# Patient Record
Sex: FEMALE | Race: WHITE | Employment: OTHER | URBAN - METROPOLITAN AREA
[De-identification: names, ages, dates, MRNs, and addresses within clinical notes are randomized per-mention and may not be internally consistent; named-entity substitution may affect disease eponyms.]

---

## 2017-01-13 ENCOUNTER — OFFICE VISIT (OUTPATIENT)
Dept: CARDIOLOGY CLINIC | Age: 67
End: 2017-01-13

## 2017-01-13 VITALS
WEIGHT: 186 LBS | SYSTOLIC BLOOD PRESSURE: 138 MMHG | HEIGHT: 65 IN | DIASTOLIC BLOOD PRESSURE: 76 MMHG | HEART RATE: 76 BPM | RESPIRATION RATE: 18 BRPM | BODY MASS INDEX: 30.99 KG/M2

## 2017-01-13 DIAGNOSIS — I25.10 CORONARY ARTERY DISEASE INVOLVING NATIVE CORONARY ARTERY OF NATIVE HEART WITHOUT ANGINA PECTORIS: Primary | ICD-10-CM

## 2017-01-13 DIAGNOSIS — I10 ESSENTIAL HYPERTENSION: ICD-10-CM

## 2017-01-13 DIAGNOSIS — E78.5 HYPERLIPIDEMIA, UNSPECIFIED: ICD-10-CM

## 2017-01-13 DIAGNOSIS — I73.9 PERIPHERAL VASCULAR DISEASE (HCC): ICD-10-CM

## 2017-01-13 PROCEDURE — 99214 OFFICE O/P EST MOD 30 MIN: CPT | Performed by: INTERNAL MEDICINE

## 2017-01-13 RX ORDER — HYDROCHLOROTHIAZIDE 50 MG/1
50 TABLET ORAL DAILY
Qty: 90 TABLET | Refills: 3 | Status: ON HOLD | OUTPATIENT
Start: 2017-01-13 | End: 2018-11-03 | Stop reason: CLARIF

## 2017-01-16 ENCOUNTER — OFFICE VISIT (OUTPATIENT)
Dept: CARDIOTHORACIC SURGERY | Age: 67
End: 2017-01-16

## 2017-01-16 VITALS
BODY MASS INDEX: 31.72 KG/M2 | HEIGHT: 65 IN | SYSTOLIC BLOOD PRESSURE: 138 MMHG | DIASTOLIC BLOOD PRESSURE: 62 MMHG | WEIGHT: 190.4 LBS

## 2017-01-16 DIAGNOSIS — T82.7XXD INFECTION OF VASCULAR BYPASS GRAFT, SUBSEQUENT ENCOUNTER: Primary | ICD-10-CM

## 2017-01-16 DIAGNOSIS — E78.5 HYPERLIPIDEMIA, UNSPECIFIED: ICD-10-CM

## 2017-01-16 PROCEDURE — 99212 OFFICE O/P EST SF 10 MIN: CPT | Performed by: SURGERY

## 2017-01-16 RX ORDER — MAGNESIUM GLUCONATE 27 MG(500)
500 TABLET ORAL 2 TIMES DAILY
COMMUNITY
End: 2017-11-15

## 2017-01-17 LAB
A/G RATIO: 1.3 (ref 1.1–2.2)
ALBUMIN SERPL-MCNC: 3.6 G/DL (ref 3.4–5)
ALP BLD-CCNC: 129 U/L (ref 40–129)
ALT SERPL-CCNC: 13 U/L (ref 10–40)
ANION GAP SERPL CALCULATED.3IONS-SCNC: 16 MMOL/L (ref 3–16)
AST SERPL-CCNC: 16 U/L (ref 15–37)
BILIRUB SERPL-MCNC: 0.4 MG/DL (ref 0–1)
BUN BLDV-MCNC: 39 MG/DL (ref 7–20)
CALCIUM SERPL-MCNC: 9.3 MG/DL (ref 8.3–10.6)
CHLORIDE BLD-SCNC: 103 MMOL/L (ref 99–110)
CHOLESTEROL, TOTAL: 203 MG/DL (ref 0–199)
CO2: 23 MMOL/L (ref 21–32)
CREAT SERPL-MCNC: 1.5 MG/DL (ref 0.6–1.2)
GFR AFRICAN AMERICAN: 42
GFR NON-AFRICAN AMERICAN: 34
GLOBULIN: 2.7 G/DL
GLUCOSE BLD-MCNC: 260 MG/DL (ref 70–99)
HDLC SERPL-MCNC: 50 MG/DL (ref 40–60)
LDL CHOLESTEROL CALCULATED: 122 MG/DL
POTASSIUM SERPL-SCNC: 5.1 MMOL/L (ref 3.5–5.1)
SODIUM BLD-SCNC: 142 MMOL/L (ref 136–145)
TOTAL PROTEIN: 6.3 G/DL (ref 6.4–8.2)
TRIGL SERPL-MCNC: 154 MG/DL (ref 0–150)
VLDLC SERPL CALC-MCNC: 31 MG/DL

## 2017-04-19 RX ORDER — CLOPIDOGREL BISULFATE 75 MG/1
75 TABLET ORAL NIGHTLY
Qty: 30 TABLET | Refills: 3 | Status: SHIPPED | OUTPATIENT
Start: 2017-04-19

## 2017-05-18 ENCOUNTER — PROCEDURE VISIT (OUTPATIENT)
Dept: VASCULAR SURGERY | Age: 67
End: 2017-05-18

## 2017-05-18 ENCOUNTER — OFFICE VISIT (OUTPATIENT)
Dept: VASCULAR SURGERY | Age: 67
End: 2017-05-18

## 2017-05-18 VITALS
WEIGHT: 197 LBS | HEIGHT: 65 IN | SYSTOLIC BLOOD PRESSURE: 132 MMHG | DIASTOLIC BLOOD PRESSURE: 60 MMHG | BODY MASS INDEX: 32.82 KG/M2

## 2017-05-18 DIAGNOSIS — I70.412 ATHEROSCLEROSIS OF AUTOLOGOUS VEIN BYPASS GRAFT OF LEFT LOWER EXTREMITY WITH INTERMITTENT CLAUDICATION (HCC): ICD-10-CM

## 2017-05-18 DIAGNOSIS — I73.9 PAD (PERIPHERAL ARTERY DISEASE) (HCC): Primary | ICD-10-CM

## 2017-05-18 DIAGNOSIS — Z48.812 ENCOUNTER FOR POSTOPERATIVE SURVEILLANCE OF VASCULAR BYPASS SURGERY: Primary | ICD-10-CM

## 2017-05-18 PROCEDURE — 93926 LOWER EXTREMITY STUDY: CPT | Performed by: SURGERY

## 2017-05-18 PROCEDURE — 99214 OFFICE O/P EST MOD 30 MIN: CPT | Performed by: SURGERY

## 2017-05-25 ENCOUNTER — HOSPITAL ENCOUNTER (OUTPATIENT)
Dept: CT IMAGING | Age: 67
Discharge: HOME | End: 2017-05-25
Attending: SURGERY | Admitting: SURGERY

## 2017-05-25 DIAGNOSIS — I73.9 PAD (PERIPHERAL ARTERY DISEASE) (HCC): ICD-10-CM

## 2017-05-25 DIAGNOSIS — I73.9 PERIPHERAL VASCULAR DISEASE (HCC): Primary | ICD-10-CM

## 2017-05-25 DIAGNOSIS — I70.412 ATHEROSCLEROSIS OF AUTOLOGOUS VEIN BYPASS GRAFT OF LEFT LOWER EXTREMITY WITH INTERMITTENT CLAUDICATION (HCC): ICD-10-CM

## 2017-05-25 LAB
ANION GAP SERPL CALCULATED.3IONS-SCNC: 13 MMOL/L (ref 3–16)
BUN BLDV-MCNC: 47 MG/DL (ref 7–20)
CALCIUM SERPL-MCNC: 9.4 MG/DL (ref 8.3–10.6)
CHLORIDE BLD-SCNC: 103 MMOL/L (ref 99–110)
CO2: 22 MMOL/L (ref 21–32)
CREAT SERPL-MCNC: 1.5 MG/DL (ref 0.6–1.2)
GFR AFRICAN AMERICAN: 42
GFR NON-AFRICAN AMERICAN: 34
GLUCOSE BLD-MCNC: 222 MG/DL (ref 70–99)
POTASSIUM SERPL-SCNC: 5.3 MMOL/L (ref 3.5–5.1)
SODIUM BLD-SCNC: 138 MMOL/L (ref 136–145)

## 2017-06-29 ENCOUNTER — OFFICE VISIT (OUTPATIENT)
Dept: VASCULAR SURGERY | Age: 67
End: 2017-06-29

## 2017-06-29 VITALS
WEIGHT: 197 LBS | BODY MASS INDEX: 32.82 KG/M2 | HEIGHT: 65 IN | SYSTOLIC BLOOD PRESSURE: 128 MMHG | DIASTOLIC BLOOD PRESSURE: 60 MMHG

## 2017-06-29 DIAGNOSIS — I70.412 ATHEROSCLEROSIS OF AUTOLOGOUS VEIN BYPASS GRAFT OF LEFT LOWER EXTREMITY WITH INTERMITTENT CLAUDICATION (HCC): Primary | ICD-10-CM

## 2017-06-29 PROCEDURE — 99213 OFFICE O/P EST LOW 20 MIN: CPT | Performed by: SURGERY

## 2017-07-19 ENCOUNTER — OFFICE VISIT (OUTPATIENT)
Dept: CARDIOLOGY CLINIC | Age: 67
End: 2017-07-19

## 2017-07-19 VITALS — SYSTOLIC BLOOD PRESSURE: 158 MMHG | HEART RATE: 64 BPM | DIASTOLIC BLOOD PRESSURE: 60 MMHG

## 2017-07-19 DIAGNOSIS — J43.9 PULMONARY EMPHYSEMA, UNSPECIFIED EMPHYSEMA TYPE (HCC): ICD-10-CM

## 2017-07-19 DIAGNOSIS — I25.10 CORONARY ARTERY DISEASE INVOLVING NATIVE CORONARY ARTERY OF NATIVE HEART WITHOUT ANGINA PECTORIS: Primary | ICD-10-CM

## 2017-07-19 DIAGNOSIS — I73.9 PERIPHERAL VASCULAR DISEASE (HCC): ICD-10-CM

## 2017-07-19 DIAGNOSIS — I10 ESSENTIAL HYPERTENSION: ICD-10-CM

## 2017-07-19 DIAGNOSIS — E78.5 HYPERLIPIDEMIA, UNSPECIFIED: ICD-10-CM

## 2017-07-19 PROCEDURE — 99214 OFFICE O/P EST MOD 30 MIN: CPT | Performed by: INTERNAL MEDICINE

## 2017-07-19 RX ORDER — CARVEDILOL 25 MG/1
25 TABLET ORAL 2 TIMES DAILY WITH MEALS
Qty: 60 TABLET | Refills: 5 | Status: SHIPPED | OUTPATIENT
Start: 2017-07-19 | End: 2018-01-26 | Stop reason: SDUPTHER

## 2017-08-11 ENCOUNTER — OFFICE VISIT (OUTPATIENT)
Dept: CARDIOLOGY CLINIC | Age: 67
End: 2017-08-11

## 2017-08-11 VITALS — DIASTOLIC BLOOD PRESSURE: 50 MMHG | BODY MASS INDEX: 33.48 KG/M2 | SYSTOLIC BLOOD PRESSURE: 110 MMHG | WEIGHT: 201.2 LBS

## 2017-08-11 DIAGNOSIS — Z98.61 S/P PTCA (PERCUTANEOUS TRANSLUMINAL CORONARY ANGIOPLASTY): ICD-10-CM

## 2017-08-11 DIAGNOSIS — I70.202 FEMORAL ARTERY STENOSIS, LEFT (HCC): Primary | ICD-10-CM

## 2017-08-11 DIAGNOSIS — I73.9 PAD (PERIPHERAL ARTERY DISEASE) (HCC): ICD-10-CM

## 2017-08-11 DIAGNOSIS — E78.00 PURE HYPERCHOLESTEROLEMIA: ICD-10-CM

## 2017-08-11 DIAGNOSIS — E78.5 HYPERLIPIDEMIA, UNSPECIFIED: ICD-10-CM

## 2017-08-11 DIAGNOSIS — I10 ESSENTIAL HYPERTENSION: ICD-10-CM

## 2017-08-11 DIAGNOSIS — E11.51 TYPE 2 DIABETES MELLITUS WITH DIABETIC PERIPHERAL ANGIOPATHY WITHOUT GANGRENE, WITH LONG-TERM CURRENT USE OF INSULIN (HCC): ICD-10-CM

## 2017-08-11 DIAGNOSIS — Z79.4 TYPE 2 DIABETES MELLITUS WITH DIABETIC PERIPHERAL ANGIOPATHY WITHOUT GANGRENE, WITH LONG-TERM CURRENT USE OF INSULIN (HCC): ICD-10-CM

## 2017-08-11 DIAGNOSIS — M54.2 CERVICAL PAIN: ICD-10-CM

## 2017-08-11 PROCEDURE — 99214 OFFICE O/P EST MOD 30 MIN: CPT | Performed by: NURSE PRACTITIONER

## 2017-09-06 LAB
AVERAGE GLUCOSE: NORMAL
HBA1C MFR BLD: 9 %

## 2017-09-27 ENCOUNTER — HOSPITAL ENCOUNTER (OUTPATIENT)
Dept: ULTRASOUND IMAGING | Age: 67
Discharge: HOME | End: 2017-09-27
Attending: INTERNAL MEDICINE | Admitting: INTERNAL MEDICINE

## 2017-09-27 DIAGNOSIS — N18.3 CKD (CHRONIC KIDNEY DISEASE), STAGE 3 (MODERATE): ICD-10-CM

## 2017-09-27 DIAGNOSIS — N18.30 CHRONIC KIDNEY DISEASE, STAGE III (MODERATE) (HCC): ICD-10-CM

## 2017-10-12 ENCOUNTER — PROCEDURE VISIT (OUTPATIENT)
Dept: VASCULAR SURGERY | Age: 67
End: 2017-10-12

## 2017-10-12 ENCOUNTER — OFFICE VISIT (OUTPATIENT)
Dept: VASCULAR SURGERY | Age: 67
End: 2017-10-12

## 2017-10-12 VITALS
BODY MASS INDEX: 32.82 KG/M2 | WEIGHT: 197 LBS | DIASTOLIC BLOOD PRESSURE: 60 MMHG | SYSTOLIC BLOOD PRESSURE: 120 MMHG | HEIGHT: 65 IN

## 2017-10-12 DIAGNOSIS — Z48.812 AFTERCARE FOLLOWING SURGERY OF THE CIRCULATORY SYSTEM: ICD-10-CM

## 2017-10-12 DIAGNOSIS — Z48.812 ENCOUNTER FOR POSTOPERATIVE SURVEILLANCE OF VASCULAR BYPASS SURGERY: Primary | ICD-10-CM

## 2017-10-12 DIAGNOSIS — I70.412 ATHEROSCLEROSIS OF AUTOLOGOUS VEIN BYPASS GRAFT OF LEFT LOWER EXTREMITY WITH INTERMITTENT CLAUDICATION (HCC): Primary | ICD-10-CM

## 2017-10-12 PROCEDURE — 93926 LOWER EXTREMITY STUDY: CPT | Performed by: SURGERY

## 2017-10-12 PROCEDURE — 99213 OFFICE O/P EST LOW 20 MIN: CPT | Performed by: SURGERY

## 2017-10-12 NOTE — PROGRESS NOTES
off on any attempt at treatment of inflow unless affecting DGV therefore early 3 month scan is necessary.

## 2017-11-15 ENCOUNTER — OFFICE VISIT (OUTPATIENT)
Dept: CARDIOLOGY CLINIC | Age: 67
End: 2017-11-15

## 2017-11-15 VITALS
HEART RATE: 70 BPM | SYSTOLIC BLOOD PRESSURE: 140 MMHG | WEIGHT: 204 LBS | DIASTOLIC BLOOD PRESSURE: 60 MMHG | BODY MASS INDEX: 33.95 KG/M2

## 2017-11-15 DIAGNOSIS — R07.9 CHEST PAIN, UNSPECIFIED TYPE: ICD-10-CM

## 2017-11-15 DIAGNOSIS — E78.2 MIXED HYPERLIPIDEMIA: ICD-10-CM

## 2017-11-15 DIAGNOSIS — Z79.4 TYPE 2 DIABETES MELLITUS WITH DIABETIC PERIPHERAL ANGIOPATHY WITHOUT GANGRENE, WITH LONG-TERM CURRENT USE OF INSULIN (HCC): ICD-10-CM

## 2017-11-15 DIAGNOSIS — I10 ESSENTIAL HYPERTENSION: ICD-10-CM

## 2017-11-15 DIAGNOSIS — E11.51 TYPE 2 DIABETES MELLITUS WITH DIABETIC PERIPHERAL ANGIOPATHY WITHOUT GANGRENE, WITH LONG-TERM CURRENT USE OF INSULIN (HCC): ICD-10-CM

## 2017-11-15 DIAGNOSIS — I25.10 CORONARY ARTERY DISEASE INVOLVING NATIVE CORONARY ARTERY OF NATIVE HEART WITHOUT ANGINA PECTORIS: Primary | ICD-10-CM

## 2017-11-15 PROCEDURE — 99214 OFFICE O/P EST MOD 30 MIN: CPT | Performed by: INTERNAL MEDICINE

## 2017-11-15 NOTE — PROGRESS NOTES
CC/HPI:    69 y.o. here for f/u. Has h/o CAD and PCI. Now seeing Dr. Jocelyn Buitrago for nephrology, and he has been helping with BP. Occasional chest pain. Gets it in mid chest, feels like \"impending doom. \" It's a pain hard to describe. Gone after taking anxiety meds (xanax). No sig nausea or vomiting. Pain in chest doesn't radiate for most part. No syncope. L leg edema. No orthopnea or PND.     Past Medical History:   Diagnosis Date    Adenomatous colon polyp 9/2015    per colonoscopy - sigmoid, repeat 1 yr    Adrenal nodule ([address] 4/25/2010    Allergic rhinitis     Anxiety     Back pain     Bacterial infection due to Staphylococcus aureus 4/25/2010    Blood transfusion     CAD (coronary artery disease) 4/25/2010    Cancer of sigmoid colon ([address]     Depressed 5/3/2010    Diabetes mellitus type II 4/25/2010    Dry skin     hands    Fibromyalgia 4/25/2010    GERD (gastroesophageal reflux disease)     History of blood transfusion     HTN (hypertension) 4/25/2010    Hyperlipemia 4/25/2010    Infection of prosthetic vascular graft (HCC) 07/2016    Insomnia     Lumbar disc disease 4/25/2010    Lumbar spinal stenosis 4/25/2010    Numbness     left arm and leg \"probably due to arteries/surgeries\"    Osteopenia 4/25/2010    Peripheral vascular disease ([address] 4/25/2010    PONV (postoperative nausea and vomiting)     Renal artery stenosis ([address] 5/17/2010    S/P vascular surgery      Past Surgical History:   Procedure Laterality Date    ABDOMINAL AORTIC [place] Nick Medina, per pt    ANKLE FRACTURE SURGERY  10/03    has hardware    AORTO-FEMORAL BYPASS GRAFT  11/92    ARTERIAL BYPASS SURGRY Left 11/2011    Dr. Tomasa Peñaloza - femoral-PT bypass   Steve Verduzco  5/3/2012    Dr. Tomasa Peñaloza - L EIA    BYPASS GRAFT Left 6/9/2011    Dr. Tomasa Peñaloza - redo L profunda fem - PT composite w/basilic & cephalic vein grafts    CARDIAC SURGERY      CHOLECYSTECTOMY, LAPAROSCOPIC  1/21/2014 07/10/2017    HGB 10.9 (L) 07/10/2017    HCT 31.7 (L) 09/08/2016    MCV 92.5 09/08/2016     07/10/2017       Chemistry        Component Value Date/Time     09/25/2017 0914    K 4.7 09/25/2017 0914     09/25/2017 0914    CO2 26 09/25/2017 0914    BUN 42 (H) 09/25/2017 0914    CREATININE 1.3 (H) 09/25/2017 0914        Component Value Date/Time    CALCIUM 8.8 09/25/2017 0914    ALKPHOS 129 01/16/2017 1500    AST 16 01/16/2017 1500    ALT 13 01/16/2017 1500    BILITOT 0.4 01/16/2017 1500        Lab Results   Component Value Date    INR 1.10 07/10/2017    INR 1.22 (H) 07/09/2016    INR 1.25 (H) 07/08/2016    PROTIME 14.0 (H) 07/09/2016    PROTIME 14.3 (H) 07/08/2016    PROTIME 14.0 (H) 07/08/2016 1/8/14 ECG: Normal    10/2011 Cath: Angiographic Findings:   Left Main: Ostial 10-20% lesion. Left Anterior Descending: Diffusely diseased and narrowed artery, from proximal to distal. There is a mid 50-60% angiographic stenosis at the bifurcation of a large diagonal branch. There is a patent stent in the proximal LAD. Circumflex: Diffusely diseased. 30% angiographic lesion in the mid vessel after the second marginal branch. The distal LCx is diseased but no flow-limiting stenosis. Right Coronary: Ostial 80% stenosis. Diffusely diseased artery. 30% mid RCA lesion. 30% distal RCA stenosis. Patent mid rca stents. RCA stented with a 2.25 x 12 mm Xience MARNIE    2013 Stress Nuc: Poss lateral ischemia. May 2014 coronary angiogram  Angiographic Findings: Right dominant system  Left Main: 20% stenosis  Left Anterior Descending: Severely and diffusely diseased artery. Mid diffuse 60% angiographic stenosis (probably more severe compared to a \"normal\" caliber artery, which she does not really have). One large diagonal branch with severe diffuse disease mid and distally. Circumflex: Minimal proximal vessel stenosis. OM 1 is very high and bifurcates distally.  The inferior branch is essentially occluded nightly, Disp: 30 tablet, Rfl: 3    hydrochlorothiazide (HYDRODIURIL) 50 MG tablet, Take 1 tablet by mouth daily, Disp: 90 tablet, Rfl: 3    oxyCODONE-acetaminophen (PERCOCET) 5-325 MG per tablet, Take 1 tablet by mouth every 8 hours as needed for Pain  Indications: taking 7.5 ., Disp: , Rfl:     insulin glargine (LANTUS) 100 UNIT/ML injection vial, Inject 10 Units into the skin daily, Disp: 1 vial, Rfl: 3    atorvastatin (LIPITOR) 40 MG tablet, Take 40 mg by mouth nightly, Disp: , Rfl:     acetaminophen (TYLENOL) 500 MG tablet, Take 500 mg by mouth every 6 hours as needed for Pain, Disp: , Rfl:     magnesium oxide (MAG-OX) 400 MG tablet, Take 400 mg by mouth daily, Disp: , Rfl:     pantoprazole (PROTONIX) 40 MG tablet, Take 1 tablet by mouth daily, Disp: 30 tablet, Rfl: 0    ALPRAZolam (XANAX) 0.25 MG tablet, Take 1 tablet by mouth 3 times daily, Disp: 90 tablet, Rfl: 0    lisinopril (PRINIVIL;ZESTRIL) 40 MG tablet, TAKE ONE TABLET BY MOUTH DAILY, Disp: 30 tablet, Rfl: 5    insulin aspart (NOVOLOG FLEXPEN) 100 UNIT/ML injection pen, 0-100  6 units, 101-150 8 units, 151-200 10 units, 201-250 12 units, 251-300 14 units, 301-350 16 units, 351-400 18 units, over 400 20 units, Disp: 15 Pen, Rfl: 3    VITAMIN D-3 SUPER STRENGTH 2000 UNITS TABS, TAKE ONE TABLET BY MOUTH EVERY DAY, Disp: 30 tablet, Rfl: 10    aspirin 81 MG tablet, Take 81 mg by mouth daily. , Disp: , Rfl:     Assessment:  69 y.o. here for f/u. Doing well from cardiac standpoint. Issues:  1. Coronary artery disease involving native coronary artery of native heart without angina pectoris    2. Essential hypertension    3. Type 2 diabetes mellitus with diabetic peripheral angiopathy without gangrene, with long-term current use of insulin ([address]    4. Mixed hyperlipidemia    5. Chest pain, unspecified type        Plan:  Cont present meds for now. F/U 6 mo, sooner if needed. Sarbjit Noriega MD, [facility], [facility]

## 2018-01-19 ENCOUNTER — PROCEDURE VISIT (OUTPATIENT)
Dept: VASCULAR SURGERY | Age: 68
End: 2018-01-19

## 2018-01-19 DIAGNOSIS — Z48.812 ENCOUNTER FOR POSTOPERATIVE SURVEILLANCE OF VASCULAR BYPASS SURGERY: Primary | ICD-10-CM

## 2018-01-19 DIAGNOSIS — I73.9 PERIPHERAL VASCULAR DISEASE (HCC): ICD-10-CM

## 2018-01-19 PROCEDURE — 93926 LOWER EXTREMITY STUDY: CPT | Performed by: SURGERY

## 2018-01-26 RX ORDER — CARVEDILOL 25 MG/1
TABLET ORAL
Qty: 60 TABLET | Refills: 4 | Status: SHIPPED | OUTPATIENT
Start: 2018-01-26 | End: 2018-06-27 | Stop reason: SDUPTHER

## 2018-02-06 ENCOUNTER — TELEPHONE (OUTPATIENT)
Dept: VASCULAR SURGERY | Age: 68
End: 2018-02-06

## 2018-06-13 ENCOUNTER — OFFICE VISIT (OUTPATIENT)
Dept: CARDIOLOGY CLINIC | Age: 68
End: 2018-06-13

## 2018-06-13 VITALS
WEIGHT: 205.2 LBS | SYSTOLIC BLOOD PRESSURE: 140 MMHG | BODY MASS INDEX: 34.15 KG/M2 | HEART RATE: 62 BPM | DIASTOLIC BLOOD PRESSURE: 70 MMHG

## 2018-06-13 DIAGNOSIS — I25.119 CORONARY ARTERY DISEASE INVOLVING NATIVE CORONARY ARTERY OF NATIVE HEART WITH ANGINA PECTORIS (HCC): Primary | ICD-10-CM

## 2018-06-13 DIAGNOSIS — I70.0 ATHEROSCLEROSIS OF AORTA (HCC): ICD-10-CM

## 2018-06-13 DIAGNOSIS — I10 ESSENTIAL HYPERTENSION: ICD-10-CM

## 2018-06-13 DIAGNOSIS — E78.00 PURE HYPERCHOLESTEROLEMIA: ICD-10-CM

## 2018-06-13 DIAGNOSIS — I73.9 PERIPHERAL VASCULAR DISEASE (HCC): ICD-10-CM

## 2018-06-13 PROCEDURE — 99214 OFFICE O/P EST MOD 30 MIN: CPT | Performed by: INTERNAL MEDICINE

## 2018-06-13 RX ORDER — AMLODIPINE BESYLATE 2.5 MG/1
2.5 TABLET ORAL DAILY
Qty: 90 TABLET | Refills: 3 | Status: ON HOLD | OUTPATIENT
Start: 2018-06-13 | End: 2018-11-03 | Stop reason: CLARIF

## 2018-06-13 RX ORDER — ISOSORBIDE MONONITRATE 120 MG/1
120 TABLET, EXTENDED RELEASE ORAL DAILY
Qty: 90 TABLET | Refills: 3 | Status: ON HOLD | OUTPATIENT
Start: 2018-06-13 | End: 2018-11-03 | Stop reason: CLARIF

## 2018-06-28 RX ORDER — CARVEDILOL 25 MG/1
TABLET ORAL
Qty: 60 TABLET | Refills: 5 | Status: ON HOLD | OUTPATIENT
Start: 2018-06-28 | End: 2018-11-03 | Stop reason: CLARIF

## 2018-07-26 ENCOUNTER — OFFICE VISIT (OUTPATIENT)
Dept: VASCULAR SURGERY | Age: 68
End: 2018-07-26

## 2018-07-26 ENCOUNTER — PROCEDURE VISIT (OUTPATIENT)
Dept: VASCULAR SURGERY | Age: 68
End: 2018-07-26

## 2018-07-26 VITALS
DIASTOLIC BLOOD PRESSURE: 60 MMHG | WEIGHT: 193 LBS | BODY MASS INDEX: 32.15 KG/M2 | SYSTOLIC BLOOD PRESSURE: 138 MMHG | HEIGHT: 65 IN

## 2018-07-26 DIAGNOSIS — I70.202 FEMORAL ARTERY STENOSIS, LEFT (HCC): ICD-10-CM

## 2018-07-26 DIAGNOSIS — I70.412 ATHEROSCLEROSIS OF AUTOLOGOUS VEIN BYPASS GRAFT OF LEFT LOWER EXTREMITY WITH INTERMITTENT CLAUDICATION (HCC): ICD-10-CM

## 2018-07-26 DIAGNOSIS — Z48.812 ENCOUNTER FOR POSTOPERATIVE SURVEILLANCE OF VASCULAR BYPASS SURGERY: Primary | ICD-10-CM

## 2018-07-26 DIAGNOSIS — Z48.812 AFTERCARE FOLLOWING SURGERY OF THE CIRCULATORY SYSTEM: Primary | ICD-10-CM

## 2018-07-26 PROCEDURE — 93926 LOWER EXTREMITY STUDY: CPT | Performed by: SURGERY

## 2018-07-26 PROCEDURE — 99213 OFFICE O/P EST LOW 20 MIN: CPT | Performed by: SURGERY

## 2018-09-16 ENCOUNTER — APPOINTMENT (OUTPATIENT)
Dept: GENERAL RADIOLOGY | Age: 68
End: 2018-09-16
Payer: COMMERCIAL

## 2018-09-16 ENCOUNTER — HOSPITAL ENCOUNTER (EMERGENCY)
Age: 68
Discharge: HOME | End: 2018-09-16
Attending: EMERGENCY MEDICINE
Payer: COMMERCIAL

## 2018-09-16 VITALS
HEIGHT: 64 IN | RESPIRATION RATE: 18 BRPM | DIASTOLIC BLOOD PRESSURE: 49 MMHG | TEMPERATURE: 97.5 F | SYSTOLIC BLOOD PRESSURE: 158 MMHG | OXYGEN SATURATION: 93 % | WEIGHT: 190 LBS | BODY MASS INDEX: 32.44 KG/M2 | HEART RATE: 59 BPM

## 2018-09-16 DIAGNOSIS — R06.89 TROUBLE BREATHING: ICD-10-CM

## 2018-09-16 DIAGNOSIS — F41.9 ANXIETY: Primary | ICD-10-CM

## 2018-09-16 DIAGNOSIS — R07.9 CHEST PAIN, UNSPECIFIED TYPE: ICD-10-CM

## 2018-09-16 LAB
BACTERIA: ABNORMAL /HPF
BILIRUBIN URINE: NEGATIVE MG/DL
BLOOD, URINE: NEGATIVE
CALCIUM IONIZED: 1.17 MMOL/L (ref 1.12–1.32)
CLARITY: ABNORMAL
CO2: 23 MMOL/L (ref 21–32)
COLOR: ABNORMAL
EPITHELIAL CELLS, UA: ABNORMAL /HPF
GFR AFRICAN AMERICAN: 34
GFR NON-AFRICAN AMERICAN: 28
GLUCOSE BLD-MCNC: 166 MG/DL (ref 70–99)
GLUCOSE URINE: NEGATIVE MG/DL
KETONES, URINE: NEGATIVE MG/DL
LEUKOCYTE ESTERASE, URINE: ABNORMAL
MICROSCOPIC EXAMINATION: YES
NITRITE, URINE: NEGATIVE
PERFORMED ON: ABNORMAL
PERFORMED ON: NORMAL
PH UA: 6
POC ANION GAP: 12 (ref 10–20)
POC BUN: 37 MG/DL (ref 7–18)
POC CHLORIDE: 101 MMOL/L (ref 99–110)
POC CREATININE: 1.8 MG/DL (ref 0.6–1.2)
POC POTASSIUM: 3.8 MMOL/L (ref 3.5–5.1)
POC SAMPLE TYPE: ABNORMAL
POC SAMPLE TYPE: NORMAL
POC SODIUM: 136 MMOL/L (ref 136–145)
POC TROPONIN I: 0.01 NG/ML (ref 0–0.1)
PROTEIN UA: 100 MG/DL
RBC UA: ABNORMAL /HPF (ref 0–2)
SPECIFIC GRAVITY UA: 1.01
UROBILINOGEN, URINE: 0.2 E.U./DL
WBC UA: ABNORMAL /HPF (ref 0–5)

## 2018-09-16 PROCEDURE — 80047 BASIC METABLC PNL IONIZED CA: CPT

## 2018-09-16 PROCEDURE — 6370000000 HC RX 637 (ALT 250 FOR IP): Performed by: EMERGENCY MEDICINE

## 2018-09-16 PROCEDURE — 87077 CULTURE AEROBIC IDENTIFY: CPT

## 2018-09-16 PROCEDURE — 84484 ASSAY OF TROPONIN QUANT: CPT

## 2018-09-16 PROCEDURE — 71046 X-RAY EXAM CHEST 2 VIEWS: CPT

## 2018-09-16 PROCEDURE — 87186 SC STD MICRODIL/AGAR DIL: CPT

## 2018-09-16 PROCEDURE — 81001 URINALYSIS AUTO W/SCOPE: CPT

## 2018-09-16 PROCEDURE — 93005 ELECTROCARDIOGRAM TRACING: CPT | Performed by: EMERGENCY MEDICINE

## 2018-09-16 PROCEDURE — 87086 URINE CULTURE/COLONY COUNT: CPT

## 2018-09-16 PROCEDURE — 99284 EMERGENCY DEPT VISIT MOD MDM: CPT

## 2018-09-16 RX ORDER — ACETAMINOPHEN 325 MG/1
650 TABLET ORAL ONCE
Status: COMPLETED | OUTPATIENT
Start: 2018-09-16 | End: 2018-09-16

## 2018-09-16 RX ORDER — ALPRAZOLAM 0.5 MG/1
0.5 TABLET ORAL ONCE
Status: COMPLETED | OUTPATIENT
Start: 2018-09-16 | End: 2018-09-16

## 2018-09-16 RX ADMIN — ACETAMINOPHEN 650 MG: 325 TABLET ORAL at 02:34

## 2018-09-16 RX ADMIN — ALPRAZOLAM 0.5 MG: 0.5 TABLET ORAL at 01:26

## 2018-09-16 ASSESSMENT — PAIN SCALES - GENERAL: PAINLEVEL_OUTOF10: 5

## 2018-09-16 NOTE — ED PROVIDER NOTES
ED Attending Attestation Note     Date of evaluation: 9/16/2018    This patient was seen by the resident. I have seen and examined the patient, agree with the workup, evaluation, management and diagnosis. The care plan has been discussed. I have reviewed the ECG and concur with the resident's interpretation. My assessment reveals A well-appearing female in no acute distress. Presentation here she was complaining of shortness of breath very tachypnea but a clear lung sounds bilaterally. States she is very anxious and had not taken her Xanax. Here she was given Xanax a complete resolution of her symptoms. She endorses a mild frontal headache but has had these before. It was not acute in onset. She completed around the emergency department was able to cannulate back to the bathroom and back without issue. Has clear lung sounds bilaterally picking. She is no respiratory distress with a benign abdominal examination.        Patsy Lowry MD  09/16/18 [id number]

## 2018-09-16 NOTE — ED NOTES
Patient arrived by squad, states awoke and felt anxious, forgot to take nerve pill prior to bedtime. Patient states family did not want to wait for her to take medication instead called life squad for evaluation.  Patient states increased anxiety at night when home alone until daughter able to come home after work after [address] am.      Aimee Ballesteros RN  09/16/18 [id number]

## 2018-09-18 LAB
ORGANISM: ABNORMAL
ORGANISM: ABNORMAL
URINE CULTURE, ROUTINE: ABNORMAL

## 2018-09-25 LAB
EKG ATRIAL RATE: 57 BPM
EKG DIAGNOSIS: NORMAL
EKG P AXIS: 61 DEGREES
EKG P-R INTERVAL: 186 MS
EKG Q-T INTERVAL: 422 MS
EKG QRS DURATION: 96 MS
EKG QTC CALCULATION (BAZETT): 410 MS
EKG R AXIS: -37 DEGREES
EKG T AXIS: 46 DEGREES
EKG VENTRICULAR RATE: 57 BPM

## 2018-11-03 ENCOUNTER — HOSPITAL ENCOUNTER (INPATIENT)
Age: 68
LOS: 11 days | Discharge: NURSING, REHAB OR LONG-TERM CARE | DRG: 056 | End: 2018-11-14
Attending: EMERGENCY MEDICINE | Admitting: INTERNAL MEDICINE
Payer: COMMERCIAL

## 2018-11-03 ENCOUNTER — APPOINTMENT (OUTPATIENT)
Dept: CT IMAGING | Age: 68
DRG: 056 | End: 2018-11-03
Payer: COMMERCIAL

## 2018-11-03 ENCOUNTER — APPOINTMENT (OUTPATIENT)
Dept: GENERAL RADIOLOGY | Age: 68
DRG: 056 | End: 2018-11-03
Payer: COMMERCIAL

## 2018-11-03 DIAGNOSIS — J18.9 PNEUMONIA DUE TO ORGANISM: ICD-10-CM

## 2018-11-03 DIAGNOSIS — R11.2 NON-INTRACTABLE VOMITING WITH NAUSEA, UNSPECIFIED VOMITING TYPE: ICD-10-CM

## 2018-11-03 DIAGNOSIS — R51.9 NONINTRACTABLE HEADACHE, UNSPECIFIED CHRONICITY PATTERN, UNSPECIFIED HEADACHE TYPE: ICD-10-CM

## 2018-11-03 DIAGNOSIS — R56.9 SEIZURE (HCC): Primary | ICD-10-CM

## 2018-11-03 DIAGNOSIS — F41.9 ANXIETY: ICD-10-CM

## 2018-11-03 LAB
A/G RATIO: 1.2 (ref 1.1–2.2)
ALBUMIN SERPL-MCNC: 3.7 G/DL (ref 3.4–5)
ALP BLD-CCNC: 89 U/L (ref 40–129)
ALT SERPL-CCNC: 13 U/L (ref 10–40)
ANION GAP SERPL CALCULATED.3IONS-SCNC: 20 MMOL/L (ref 3–16)
AST SERPL-CCNC: 17 U/L (ref 15–37)
BACTERIA: ABNORMAL /HPF
BASE EXCESS VENOUS: -2 (ref -3–3)
BASOPHILS ABSOLUTE: 0.1 K/UL (ref 0–0.2)
BASOPHILS RELATIVE PERCENT: 0.9 %
BILIRUB SERPL-MCNC: 0.6 MG/DL (ref 0–1)
BILIRUBIN URINE: NEGATIVE MG/DL
BLOOD, URINE: ABNORMAL
BUN BLDV-MCNC: 22 MG/DL (ref 7–20)
CALCIUM SERPL-MCNC: 9.9 MG/DL (ref 8.3–10.6)
CHLORIDE BLD-SCNC: 101 MMOL/L (ref 99–110)
CLARITY: ABNORMAL
CO2: 20 MMOL/L (ref 21–32)
COLOR: ABNORMAL
CREAT SERPL-MCNC: 1.3 MG/DL (ref 0.6–1.2)
EOSINOPHILS ABSOLUTE: 0.5 K/UL (ref 0–0.6)
EOSINOPHILS RELATIVE PERCENT: 3.7 %
EPITHELIAL CELLS, UA: ABNORMAL /HPF
GFR AFRICAN AMERICAN: 49
GFR NON-AFRICAN AMERICAN: 40
GLOBULIN: 3 G/DL
GLUCOSE BLD-MCNC: 269 MG/DL (ref 70–99)
GLUCOSE URINE: 250 MG/DL
HCO3 VENOUS: 23 MMOL/L (ref 23–29)
HCT VFR BLD CALC: 37 % (ref 36–48)
HEMOGLOBIN: 12.5 G/DL (ref 12–16)
KETONES, URINE: NEGATIVE MG/DL
LACTATE: 11.96 MMOL/L (ref 0.4–2)
LACTATE: 4 MMOL/L (ref 0.4–2)
LEUKOCYTE ESTERASE, URINE: NEGATIVE
LIPASE: 22 U/L (ref 13–60)
LYMPHOCYTES ABSOLUTE: 1.2 K/UL (ref 1–5.1)
LYMPHOCYTES RELATIVE PERCENT: 9.7 %
MCH RBC QN AUTO: 31.6 PG (ref 26–34)
MCHC RBC AUTO-ENTMCNC: 33.7 G/DL (ref 31–36)
MCV RBC AUTO: 93.7 FL (ref 80–100)
MICROSCOPIC EXAMINATION: YES
MONOCYTES ABSOLUTE: 0.7 K/UL (ref 0–1.3)
MONOCYTES RELATIVE PERCENT: 5.4 %
NEUTROPHILS ABSOLUTE: 9.9 K/UL (ref 1.7–7.7)
NEUTROPHILS RELATIVE PERCENT: 80.3 %
NITRITE, URINE: NEGATIVE
O2 SAT, VEN: 81 %
PCO2, VEN: 40.2 MM HG (ref 40–50)
PDW BLD-RTO: 13.6 % (ref 12.4–15.4)
PERFORMED ON: ABNORMAL
PERFORMED ON: ABNORMAL
PH UA: 7
PH VENOUS: 7.37 (ref 7.35–7.45)
PLATELET # BLD: 202 K/UL (ref 135–450)
PMV BLD AUTO: 9.6 FL (ref 5–10.5)
PO2, VEN: 46 MM HG
POC SAMPLE TYPE: ABNORMAL
POC SAMPLE TYPE: ABNORMAL
POTASSIUM SERPL-SCNC: 4 MMOL/L (ref 3.5–5.1)
PROTEIN UA: >=300 MG/DL
RBC # BLD: 3.95 M/UL (ref 4–5.2)
RBC UA: ABNORMAL /HPF (ref 0–2)
SODIUM BLD-SCNC: 141 MMOL/L (ref 136–145)
SPECIFIC GRAVITY UA: 1.02
TCO2 CALC VENOUS: 24 MMOL/L
TOTAL PROTEIN: 6.7 G/DL (ref 6.4–8.2)
TROPONIN: 0.03 NG/ML
UROBILINOGEN, URINE: 0.2 E.U./DL
WBC # BLD: 12.3 K/UL (ref 4–11)
WBC UA: ABNORMAL /HPF (ref 0–5)

## 2018-11-03 PROCEDURE — 99285 EMERGENCY DEPT VISIT HI MDM: CPT

## 2018-11-03 PROCEDURE — 93005 ELECTROCARDIOGRAM TRACING: CPT | Performed by: EMERGENCY MEDICINE

## 2018-11-03 PROCEDURE — 80053 COMPREHEN METABOLIC PANEL: CPT

## 2018-11-03 PROCEDURE — 81001 URINALYSIS AUTO W/SCOPE: CPT

## 2018-11-03 PROCEDURE — 96367 TX/PROPH/DG ADDL SEQ IV INF: CPT

## 2018-11-03 PROCEDURE — 1200000000 HC SEMI PRIVATE

## 2018-11-03 PROCEDURE — 2580000003 HC RX 258: Performed by: EMERGENCY MEDICINE

## 2018-11-03 PROCEDURE — 6360000002 HC RX W HCPCS: Performed by: EMERGENCY MEDICINE

## 2018-11-03 PROCEDURE — 96365 THER/PROPH/DIAG IV INF INIT: CPT

## 2018-11-03 PROCEDURE — 6360000004 HC RX CONTRAST MEDICATION: Performed by: EMERGENCY MEDICINE

## 2018-11-03 PROCEDURE — 82803 BLOOD GASES ANY COMBINATION: CPT

## 2018-11-03 PROCEDURE — 83605 ASSAY OF LACTIC ACID: CPT

## 2018-11-03 PROCEDURE — 96368 THER/DIAG CONCURRENT INF: CPT

## 2018-11-03 PROCEDURE — 6360000002 HC RX W HCPCS

## 2018-11-03 PROCEDURE — 84484 ASSAY OF TROPONIN QUANT: CPT

## 2018-11-03 PROCEDURE — 83690 ASSAY OF LIPASE: CPT

## 2018-11-03 PROCEDURE — 96366 THER/PROPH/DIAG IV INF ADDON: CPT

## 2018-11-03 PROCEDURE — 70450 CT HEAD/BRAIN W/O DYE: CPT

## 2018-11-03 PROCEDURE — 96375 TX/PRO/DX INJ NEW DRUG ADDON: CPT

## 2018-11-03 PROCEDURE — 36415 COLL VENOUS BLD VENIPUNCTURE: CPT

## 2018-11-03 PROCEDURE — 96361 HYDRATE IV INFUSION ADD-ON: CPT

## 2018-11-03 PROCEDURE — 74177 CT ABD & PELVIS W/CONTRAST: CPT

## 2018-11-03 PROCEDURE — 85025 COMPLETE CBC W/AUTO DIFF WBC: CPT

## 2018-11-03 PROCEDURE — 71045 X-RAY EXAM CHEST 1 VIEW: CPT

## 2018-11-03 RX ORDER — ASPIRIN 325 MG
324 TABLET ORAL DAILY
Status: DISCONTINUED | OUTPATIENT
Start: 2018-11-04 | End: 2018-11-07

## 2018-11-03 RX ORDER — DEXTROSE MONOHYDRATE 50 MG/ML
100 INJECTION, SOLUTION INTRAVENOUS PRN
Status: DISCONTINUED | OUTPATIENT
Start: 2018-11-03 | End: 2018-11-14 | Stop reason: HOSPADM

## 2018-11-03 RX ORDER — DEXTROSE MONOHYDRATE 25 G/50ML
12.5 INJECTION, SOLUTION INTRAVENOUS PRN
Status: DISCONTINUED | OUTPATIENT
Start: 2018-11-03 | End: 2018-11-14 | Stop reason: HOSPADM

## 2018-11-03 RX ORDER — ESCITALOPRAM OXALATE 10 MG/1
10 TABLET ORAL DAILY
COMMUNITY

## 2018-11-03 RX ORDER — LEVETIRACETAM 10 MG/ML
1000 INJECTION INTRAVASCULAR ONCE
Status: DISCONTINUED | OUTPATIENT
Start: 2018-11-03 | End: 2018-11-03 | Stop reason: SDUPTHER

## 2018-11-03 RX ORDER — ONDANSETRON 4 MG/1
4 TABLET, FILM COATED ORAL EVERY 6 HOURS PRN
COMMUNITY

## 2018-11-03 RX ORDER — OXYCODONE AND ACETAMINOPHEN 7.5; 325 MG/1; MG/1
1 TABLET ORAL EVERY 8 HOURS PRN
COMMUNITY

## 2018-11-03 RX ORDER — ASPIRIN 81 MG/1
162 TABLET, CHEWABLE ORAL ONCE
Status: DISCONTINUED | OUTPATIENT
Start: 2018-11-03 | End: 2018-11-04

## 2018-11-03 RX ORDER — INSULIN GLARGINE 100 [IU]/ML
20 INJECTION, SOLUTION SUBCUTANEOUS NIGHTLY
COMMUNITY

## 2018-11-03 RX ORDER — LIDOCAINE HYDROCHLORIDE 10 MG/ML
5 INJECTION, SOLUTION EPIDURAL; INFILTRATION; INTRACAUDAL; PERINEURAL ONCE
Status: DISCONTINUED | OUTPATIENT
Start: 2018-11-03 | End: 2018-11-10

## 2018-11-03 RX ORDER — LOPERAMIDE HYDROCHLORIDE 2 MG/1
2 CAPSULE ORAL 4 TIMES DAILY PRN
COMMUNITY

## 2018-11-03 RX ORDER — CLOPIDOGREL BISULFATE 75 MG/1
75 TABLET ORAL DAILY
Status: DISCONTINUED | OUTPATIENT
Start: 2018-11-04 | End: 2018-11-09

## 2018-11-03 RX ORDER — 0.9 % SODIUM CHLORIDE 0.9 %
1000 INTRAVENOUS SOLUTION INTRAVENOUS ONCE
Status: COMPLETED | OUTPATIENT
Start: 2018-11-03 | End: 2018-11-03

## 2018-11-03 RX ORDER — LEVETIRACETAM 500 MG/1
500 TABLET ORAL EVERY 12 HOURS
Status: DISCONTINUED | OUTPATIENT
Start: 2018-11-03 | End: 2018-11-04

## 2018-11-03 RX ORDER — ESCITALOPRAM OXALATE 10 MG/1
10 TABLET ORAL DAILY
Status: DISCONTINUED | OUTPATIENT
Start: 2018-11-04 | End: 2018-11-14 | Stop reason: HOSPADM

## 2018-11-03 RX ORDER — ATORVASTATIN CALCIUM 40 MG/1
40 TABLET, FILM COATED ORAL NIGHTLY
Status: DISCONTINUED | OUTPATIENT
Start: 2018-11-03 | End: 2018-11-14 | Stop reason: HOSPADM

## 2018-11-03 RX ORDER — NICOTINE POLACRILEX 4 MG
15 LOZENGE BUCCAL PRN
Status: DISCONTINUED | OUTPATIENT
Start: 2018-11-03 | End: 2018-11-14 | Stop reason: HOSPADM

## 2018-11-03 RX ORDER — SODIUM CHLORIDE, SODIUM LACTATE, POTASSIUM CHLORIDE, AND CALCIUM CHLORIDE .6; .31; .03; .02 G/100ML; G/100ML; G/100ML; G/100ML
1000 INJECTION, SOLUTION INTRAVENOUS ONCE
Status: COMPLETED | OUTPATIENT
Start: 2018-11-03 | End: 2018-11-03

## 2018-11-03 RX ORDER — ONDANSETRON 2 MG/ML
4 INJECTION INTRAMUSCULAR; INTRAVENOUS
Status: ACTIVE | OUTPATIENT
Start: 2018-11-03 | End: 2018-11-03

## 2018-11-03 RX ORDER — ALPRAZOLAM 0.25 MG/1
0.25 TABLET ORAL 3 TIMES DAILY
Status: DISCONTINUED | OUTPATIENT
Start: 2018-11-03 | End: 2018-11-11

## 2018-11-03 RX ORDER — SODIUM CHLORIDE 9 MG/ML
INJECTION, SOLUTION INTRAVENOUS CONTINUOUS
Status: DISCONTINUED | OUTPATIENT
Start: 2018-11-03 | End: 2018-11-08

## 2018-11-03 RX ORDER — NITROFURANTOIN MACROCRYSTALS 100 MG/1
100 CAPSULE ORAL 2 TIMES DAILY
Status: ON HOLD | COMMUNITY
End: 2018-11-03 | Stop reason: CLARIF

## 2018-11-03 RX ORDER — ONDANSETRON 2 MG/ML
INJECTION INTRAMUSCULAR; INTRAVENOUS
Status: COMPLETED
Start: 2018-11-03 | End: 2018-11-03

## 2018-11-03 RX ADMIN — VANCOMYCIN HYDROCHLORIDE 2000 MG: 10 INJECTION, POWDER, LYOPHILIZED, FOR SOLUTION INTRAVENOUS at 19:16

## 2018-11-03 RX ADMIN — IOPAMIDOL 80 ML: 755 INJECTION, SOLUTION INTRAVENOUS at 16:33

## 2018-11-03 RX ADMIN — MEROPENEM 1 G: 1 INJECTION, POWDER, FOR SOLUTION INTRAVENOUS at 17:47

## 2018-11-03 RX ADMIN — LEVETIRACETAM 1000 MG: 100 INJECTION, SOLUTION INTRAVENOUS at 17:50

## 2018-11-03 RX ADMIN — SODIUM CHLORIDE 1000 ML: 0.9 INJECTION, SOLUTION INTRAVENOUS at 15:51

## 2018-11-03 RX ADMIN — ONDANSETRON 4 MG: 2 INJECTION INTRAMUSCULAR; INTRAVENOUS at 15:10

## 2018-11-03 RX ADMIN — SODIUM CHLORIDE, POTASSIUM CHLORIDE, SODIUM LACTATE AND CALCIUM CHLORIDE 1000 ML: 600; 310; 30; 20 INJECTION, SOLUTION INTRAVENOUS at 18:39

## 2018-11-03 RX ADMIN — PROCHLORPERAZINE EDISYLATE 10 MG: 5 INJECTION INTRAMUSCULAR; INTRAVENOUS at 16:59

## 2018-11-03 RX ADMIN — SODIUM CHLORIDE, POTASSIUM CHLORIDE, SODIUM LACTATE AND CALCIUM CHLORIDE 1000 ML: 600; 310; 30; 20 INJECTION, SOLUTION INTRAVENOUS at 16:59

## 2018-11-03 ASSESSMENT — ENCOUNTER SYMPTOMS
RHINORRHEA: 0
NAUSEA: 1
SHORTNESS OF BREATH: 0
COUGH: 0
DIARRHEA: 0
BACK PAIN: 0
ABDOMINAL PAIN: 0
VOMITING: 1

## 2018-11-03 NOTE — ED PROVIDER NOTES
INJECTION PEN    0-100  6 units, 101-150 8 units, 151-200 10 units, 201-250 12 units, 251-300 14 units, 301-350 16 units, 351-400 18 units, over 400 20 units    INSULIN GLARGINE (LANTUS) 100 UNIT/ML INJECTION VIAL    Inject 10 Units into the skin daily    INSULIN LISPRO (HUMALOG) 100 UNIT/ML INJECTION VIAL    Inject into the skin 3 times daily (before meals) Sliding scale    INSULIN LISPRO (HUMALOG) 100 UNIT/ML INJECTION VIAL    Inject 3 Units into the skin 3 times daily (before meals) 3 units Sq before meals plus sliding scale. ISOSORBIDE MONONITRATE (IMDUR) 120 MG EXTENDED RELEASE TABLET    Take 1 tablet by mouth daily    ISOSORBIDE MONONITRATE (IMDUR) 60 MG EXTENDED RELEASE TABLET    TAKE ONE TABLET BY MOUTH EVERY MORNING    LISINOPRIL (PRINIVIL;ZESTRIL) 40 MG TABLET    TAKE ONE TABLET BY MOUTH DAILY    MAGNESIUM OXIDE (MAG-OX) 400 MG TABLET    Take 400 mg by mouth daily    NITROFURANTOIN (MACRODANTIN) 100 MG CAPSULE    Take 100 mg by mouth 2 times daily    OXYCODONE-ACETAMINOPHEN (PERCOCET) 5-325 MG PER TABLET    Take 1 tablet by mouth every 8 hours as needed for Pain  Indications: taking 7.5 . PANTOPRAZOLE (PROTONIX) 40 MG TABLET    Take 1 tablet by mouth daily    VITAMIN D-3 SUPER STRENGTH 2000 UNITS TABS    TAKE ONE TABLET BY MOUTH EVERY DAY       Allergies     Sheis allergic to latex; adhesive tape; cephalexin; chocolate; peanut-containing drug products; ativan [lorazepam]; codeine; dilaudid [hydromorphone hcl]; and penicillins. Physical Exam     INITIAL VITALS: BP: (!) 163/121,Temp: 97.8 °F (36.6 °C), Pulse: 92, Resp: 18, SpO2: 98 %   Physical Exam   Constitutional: She is oriented to person, place, and time. She appears well-developed and well-nourished. No distress. HENT:   Head: Normocephalic and atraumatic. Eyes: EOM are normal. No scleral icterus. Neck: Normal range of motion. No tracheal deviation present.    Cardiovascular: Normal rate, regular rhythm, normal heart sounds and intact Established %    TC02 (Calc), Janusz 24 Not Established mmol/L    Lactate 4.00 (HH) 0.40 - 2.00 mmol/L    Sample Type JANUSZ     Performed on SEE BELOW        ED BEDSIDE ULTRASOUND:  N/A    RECENT VITALS:  BP: (!) 194/158, Temp: 97.8 °F (36.6 °C), Pulse: 90, Resp: 17, SpO2: 100 %     Procedures     N/A    MEDICAL DECISION MAKING / ASSESSMENT / Ariel Pillsushila is a 77 y.o. female  with a history of ?dementia, CAD, PAD, DM2, HL, and CKD who presents with nausea and vomiting. Unclear etiology of symptoms. Will evaluate for ACS, biliary disease, pancreatitis, and other intraabdominal pathology. Plan: ECG, labs as below, likely CT A/P, nausea control, IVF    ED Course     Nursing Notes, Past Medical Hx, Past Surgical Hx, Social Hx, Allergies, and Family Hx were reviewed. The patient was given the followingmedications:  Orders Placed This Encounter   Medications    ondansetron (ZOFRAN) 4 MG/2ML injection     Gera Ferguson: cabinet override    ondansetron (ZOFRAN) injection 4 mg    0.9 % sodium chloride bolus    lactated ringers bolus    iopamidol (ISOVUE-370) 76 % injection 80 mL    prochlorperazine (COMPAZINE) injection 10 mg    aspirin chewable tablet 162 mg    DISCONTD: levetiracetam (KEPPRA) 1000 mg/100 mL IVPB    levETIRAcetam (KEPPRA) 1,000 mg in sodium chloride 0.9 % 100 mL IVPB    lactated ringers bolus    vancomycin (VANCOCIN) 2,000 mg in dextrose 5 % 500 mL IVPB    meropenem (MERREM) 1 g in sodium chloride 0.9 % 100 mL IVPB (mini-bag)    lidocaine PF 1 % injection 5 mL       CONSULTS:  IP CONSULT TO HOSPITALIST    ED Course as of Nov 03 2008   Sat Nov 03, 2018   1535 Mild leukocytosis with neutrophilia WBC: (!) 12.3 [AZ]   1606 Elevated with mild gap, VBG and UA pending Glucose: (!) 269 [AZ]   1607 Neg Lipase: 22.0 [AZ]   1617 Per family diagnosed with UTI 2d ago and started on nitrofurantoin. Bilious emesis earlier today. Also with significant headache. CT head ordered.   [AZ]   1622 Mildly elevated, ASA ordered Troponin: (!) 0.03 [AZ]   1644 Called to CT. <1m GTC seizure after finishing CT. Post-ictal now. Levetiracetam 1g ordered. No prior seizure history. [AZ]   1706 1.  Subacute infarct in the right occipital lobe. 2.  No acute intracranial hemorrhage or mass effect. CT Head WO Contrast [AZ]   1707 Reviewed OSH imaging report. R occipital infarct September 2018 c/w CT findings today.  [AZ]   1723 Markedly elevated though s/p seizure, IVF and antibiotics ordered Lactate, ser/plas: (!!) 11.96 [AZ]   1737 No change in 3 cm x 4.2 cm right adrenal mass. Extensive arterial wall calcification. No change in small aneurysm left common iliac artery. No abnormal bowel dilatation or wall thickening. No acute abnormality or abnormal fluid collection. CT ABDOMEN PELVIS W IV CONTRAST Additional Contrast? None [AZ]   1804 Unclear onset for headache. Could be >12h making CT less sensitive for [address] Other considerations include meningitis though afebrile without meningismus. Consented family for LP. [AZ]   [id number] Not c/w DKA Ketones, Urine: Negative [AZ]   1833 Several attempts at LP. Unsuccessful. Still altered 2+ hours s/p seizure. Gwyndolyn Barnacle Not c/w infection but on antibiotics Leukocyte Esterase, Urine: Negative [AZ]   1914 Clearing but still elevated Lactate, ser/plas: (!!) 4.00 [AZ]   1923 No clear etiology for symptoms but persistently altered s/p seizure. Admitted to medicine for further work-up and management.  [AZ]   2001 Right lower lobe atelectasis and patchy pneumonia. XR CHEST PORTABLE [AZ]      ED Course User Index  [AZ] Liza Quinn MD       Clinical Impression     1. Seizure ([facility]    2. Non-intractable vomiting with nausea, unspecified vomiting type    3. Nonintractable headache, unspecified chronicity pattern, unspecified headache type    4. Pneumonia due to organism        Disposition     PATIENT REFERRED TO:  No follow-up provider specified.     DISCHARGEMEDICATIONS:  Maxim Emery

## 2018-11-03 NOTE — ED NOTES
Dr Shirley Spivey called to ct pt had grand-mal seizure activity. Pox dropped to 70%. Pt placed on to a NRB 02 at 15 lpm. Pox increased to 100 %.      Chavez Calzada RN  11/03/18 [id number]

## 2018-11-04 LAB
AMMONIA: 17 UMOL/L (ref 11–51)
ANION GAP SERPL CALCULATED.3IONS-SCNC: 14 MMOL/L (ref 3–16)
BASOPHILS ABSOLUTE: 0.1 K/UL (ref 0–0.2)
BASOPHILS RELATIVE PERCENT: 0.9 %
BUN BLDV-MCNC: 17 MG/DL (ref 7–20)
CALCIUM SERPL-MCNC: 8.7 MG/DL (ref 8.3–10.6)
CHLORIDE BLD-SCNC: 97 MMOL/L (ref 99–110)
CO2: 23 MMOL/L (ref 21–32)
CREAT SERPL-MCNC: 1.3 MG/DL (ref 0.6–1.2)
EKG ATRIAL RATE: 78 BPM
EKG ATRIAL RATE: 88 BPM
EKG DIAGNOSIS: NORMAL
EKG DIAGNOSIS: NORMAL
EKG P AXIS: 70 DEGREES
EKG P AXIS: 72 DEGREES
EKG P-R INTERVAL: 176 MS
EKG P-R INTERVAL: 186 MS
EKG Q-T INTERVAL: 388 MS
EKG Q-T INTERVAL: 406 MS
EKG QRS DURATION: 98 MS
EKG QRS DURATION: 98 MS
EKG QTC CALCULATION (BAZETT): 462 MS
EKG QTC CALCULATION (BAZETT): 469 MS
EKG R AXIS: -35 DEGREES
EKG R AXIS: -36 DEGREES
EKG T AXIS: 68 DEGREES
EKG T AXIS: 87 DEGREES
EKG VENTRICULAR RATE: 78 BPM
EKG VENTRICULAR RATE: 88 BPM
EOSINOPHILS ABSOLUTE: 0 K/UL (ref 0–0.6)
EOSINOPHILS RELATIVE PERCENT: 0 %
GFR AFRICAN AMERICAN: 49
GFR NON-AFRICAN AMERICAN: 40
GLUCOSE BLD-MCNC: 101 MG/DL (ref 70–99)
GLUCOSE BLD-MCNC: 126 MG/DL (ref 70–99)
GLUCOSE BLD-MCNC: 157 MG/DL (ref 70–99)
GLUCOSE BLD-MCNC: 207 MG/DL (ref 70–99)
GLUCOSE BLD-MCNC: 295 MG/DL (ref 70–99)
GLUCOSE BLD-MCNC: 312 MG/DL (ref 70–99)
HCT VFR BLD CALC: 32.1 % (ref 36–48)
HEMOGLOBIN: 11.2 G/DL (ref 12–16)
LACTIC ACID: 1.3 MMOL/L (ref 0.4–2)
LYMPHOCYTES ABSOLUTE: 0.7 K/UL (ref 1–5.1)
LYMPHOCYTES RELATIVE PERCENT: 4.7 %
MAGNESIUM: 1.2 MG/DL (ref 1.8–2.4)
MCH RBC QN AUTO: 32.5 PG (ref 26–34)
MCHC RBC AUTO-ENTMCNC: 35 G/DL (ref 31–36)
MCV RBC AUTO: 92.8 FL (ref 80–100)
MONOCYTES ABSOLUTE: 0.4 K/UL (ref 0–1.3)
MONOCYTES RELATIVE PERCENT: 2.7 %
NEUTROPHILS ABSOLUTE: 14.6 K/UL (ref 1.7–7.7)
NEUTROPHILS RELATIVE PERCENT: 91.7 %
PDW BLD-RTO: 13.6 % (ref 12.4–15.4)
PERFORMED ON: ABNORMAL
PLATELET # BLD: 193 K/UL (ref 135–450)
PMV BLD AUTO: 9.3 FL (ref 5–10.5)
POTASSIUM REFLEX MAGNESIUM: 3.8 MMOL/L (ref 3.5–5.1)
PROCALCITONIN: 0.27 NG/ML (ref 0–0.15)
RAPID INFLUENZA  B AGN: NEGATIVE
RAPID INFLUENZA A AGN: NEGATIVE
RBC # BLD: 3.46 M/UL (ref 4–5.2)
SODIUM BLD-SCNC: 134 MMOL/L (ref 136–145)
TOTAL CK: 202 U/L (ref 26–192)
TROPONIN: 0.04 NG/ML
TROPONIN: 0.04 NG/ML
TSH REFLEX: 1.19 UIU/ML (ref 0.27–4.2)
VITAMIN B-12: 374 PG/ML (ref 211–911)
WBC # BLD: 15.9 K/UL (ref 4–11)

## 2018-11-04 PROCEDURE — 6370000000 HC RX 637 (ALT 250 FOR IP): Performed by: STUDENT IN AN ORGANIZED HEALTH CARE EDUCATION/TRAINING PROGRAM

## 2018-11-04 PROCEDURE — 2580000003 HC RX 258: Performed by: EMERGENCY MEDICINE

## 2018-11-04 PROCEDURE — 84484 ASSAY OF TROPONIN QUANT: CPT

## 2018-11-04 PROCEDURE — 92610 EVALUATE SWALLOWING FUNCTION: CPT

## 2018-11-04 PROCEDURE — 82140 ASSAY OF AMMONIA: CPT

## 2018-11-04 PROCEDURE — 84443 ASSAY THYROID STIM HORMONE: CPT

## 2018-11-04 PROCEDURE — 2700000000 HC OXYGEN THERAPY PER DAY

## 2018-11-04 PROCEDURE — 84145 PROCALCITONIN (PCT): CPT

## 2018-11-04 PROCEDURE — 6360000002 HC RX W HCPCS: Performed by: EMERGENCY MEDICINE

## 2018-11-04 PROCEDURE — 6360000002 HC RX W HCPCS: Performed by: INTERNAL MEDICINE

## 2018-11-04 PROCEDURE — 6370000000 HC RX 637 (ALT 250 FOR IP): Performed by: INTERNAL MEDICINE

## 2018-11-04 PROCEDURE — 83605 ASSAY OF LACTIC ACID: CPT

## 2018-11-04 PROCEDURE — 93010 ELECTROCARDIOGRAM REPORT: CPT | Performed by: INTERNAL MEDICINE

## 2018-11-04 PROCEDURE — 82607 VITAMIN B-12: CPT

## 2018-11-04 PROCEDURE — 2580000003 HC RX 258: Performed by: STUDENT IN AN ORGANIZED HEALTH CARE EDUCATION/TRAINING PROGRAM

## 2018-11-04 PROCEDURE — 1200000000 HC SEMI PRIVATE

## 2018-11-04 PROCEDURE — G8997 SWALLOW GOAL STATUS: HCPCS

## 2018-11-04 PROCEDURE — 80048 BASIC METABOLIC PNL TOTAL CA: CPT

## 2018-11-04 PROCEDURE — 93005 ELECTROCARDIOGRAM TRACING: CPT | Performed by: STUDENT IN AN ORGANIZED HEALTH CARE EDUCATION/TRAINING PROGRAM

## 2018-11-04 PROCEDURE — 83735 ASSAY OF MAGNESIUM: CPT

## 2018-11-04 PROCEDURE — 82550 ASSAY OF CK (CPK): CPT

## 2018-11-04 PROCEDURE — 94761 N-INVAS EAR/PLS OXIMETRY MLT: CPT

## 2018-11-04 PROCEDURE — 84425 ASSAY OF VITAMIN B-1: CPT

## 2018-11-04 PROCEDURE — 89220 SPUTUM SPECIMEN COLLECTION: CPT

## 2018-11-04 PROCEDURE — 94664 DEMO&/EVAL PT USE INHALER: CPT

## 2018-11-04 PROCEDURE — 87804 INFLUENZA ASSAY W/OPTIC: CPT

## 2018-11-04 PROCEDURE — 85025 COMPLETE CBC W/AUTO DIFF WBC: CPT

## 2018-11-04 PROCEDURE — 6360000002 HC RX W HCPCS: Performed by: STUDENT IN AN ORGANIZED HEALTH CARE EDUCATION/TRAINING PROGRAM

## 2018-11-04 PROCEDURE — 36415 COLL VENOUS BLD VENIPUNCTURE: CPT

## 2018-11-04 PROCEDURE — G8996 SWALLOW CURRENT STATUS: HCPCS

## 2018-11-04 RX ORDER — PANTOPRAZOLE SODIUM 40 MG/1
40 TABLET, DELAYED RELEASE ORAL
Status: DISCONTINUED | OUTPATIENT
Start: 2018-11-04 | End: 2018-11-10

## 2018-11-04 RX ORDER — MAGNESIUM SULFATE IN WATER 40 MG/ML
2 INJECTION, SOLUTION INTRAVENOUS
Status: DISPENSED | OUTPATIENT
Start: 2018-11-04 | End: 2018-11-04

## 2018-11-04 RX ORDER — LABETALOL HYDROCHLORIDE 5 MG/ML
10 INJECTION, SOLUTION INTRAVENOUS EVERY 4 HOURS PRN
Status: DISCONTINUED | OUTPATIENT
Start: 2018-11-04 | End: 2018-11-14 | Stop reason: HOSPADM

## 2018-11-04 RX ORDER — ACETAMINOPHEN 325 MG/1
650 TABLET ORAL EVERY 4 HOURS PRN
Status: DISCONTINUED | OUTPATIENT
Start: 2018-11-04 | End: 2018-11-14 | Stop reason: HOSPADM

## 2018-11-04 RX ORDER — MAGNESIUM SULFATE IN WATER 40 MG/ML
2 INJECTION, SOLUTION INTRAVENOUS ONCE
Status: COMPLETED | OUTPATIENT
Start: 2018-11-04 | End: 2018-11-04

## 2018-11-04 RX ADMIN — SODIUM CHLORIDE: 9 INJECTION, SOLUTION INTRAVENOUS at 00:25

## 2018-11-04 RX ADMIN — ACETAMINOPHEN 650 MG: 325 TABLET, FILM COATED ORAL at 21:15

## 2018-11-04 RX ADMIN — MAGNESIUM SULFATE HEPTAHYDRATE 2 G: 40 INJECTION, SOLUTION INTRAVENOUS at 06:57

## 2018-11-04 RX ADMIN — MEROPENEM 1 G: 1 INJECTION, POWDER, FOR SOLUTION INTRAVENOUS at 00:24

## 2018-11-04 RX ADMIN — CLOPIDOGREL BISULFATE 75 MG: 75 TABLET ORAL at 08:30

## 2018-11-04 RX ADMIN — MEROPENEM 1 G: 1 INJECTION, POWDER, FOR SOLUTION INTRAVENOUS at 08:30

## 2018-11-04 RX ADMIN — PANTOPRAZOLE SODIUM 40 MG: 40 TABLET, DELAYED RELEASE ORAL at 08:30

## 2018-11-04 RX ADMIN — INSULIN LISPRO 3 UNITS: 100 INJECTION, SOLUTION INTRAVENOUS; SUBCUTANEOUS at 01:19

## 2018-11-04 RX ADMIN — LEVETIRACETAM 500 MG: 100 INJECTION, SOLUTION INTRAVENOUS at 17:34

## 2018-11-04 RX ADMIN — INSULIN GLARGINE 20 UNITS: 100 INJECTION, SOLUTION SUBCUTANEOUS at 21:16

## 2018-11-04 RX ADMIN — ALPRAZOLAM 0.25 MG: 0.25 TABLET ORAL at 16:55

## 2018-11-04 RX ADMIN — LEVETIRACETAM 500 MG: 100 INJECTION, SOLUTION INTRAVENOUS at 07:39

## 2018-11-04 RX ADMIN — ESCITALOPRAM OXALATE 10 MG: 10 TABLET ORAL at 08:30

## 2018-11-04 RX ADMIN — ASPIRIN 325 MG ORAL TABLET 324 MG: 325 PILL ORAL at 08:32

## 2018-11-04 RX ADMIN — ALPRAZOLAM 0.25 MG: 0.25 TABLET ORAL at 08:30

## 2018-11-04 RX ADMIN — ATORVASTATIN CALCIUM 40 MG: 40 TABLET, FILM COATED ORAL at 21:15

## 2018-11-04 RX ADMIN — INSULIN LISPRO 1 UNITS: 100 INJECTION, SOLUTION INTRAVENOUS; SUBCUTANEOUS at 21:16

## 2018-11-04 RX ADMIN — ALPRAZOLAM 0.25 MG: 0.25 TABLET ORAL at 21:15

## 2018-11-04 RX ADMIN — MAGNESIUM SULFATE HEPTAHYDRATE 2 G: 40 INJECTION, SOLUTION INTRAVENOUS at 01:18

## 2018-11-04 RX ADMIN — INSULIN LISPRO 4 UNITS: 100 INJECTION, SOLUTION INTRAVENOUS; SUBCUTANEOUS at 08:15

## 2018-11-04 RX ADMIN — INSULIN GLARGINE 20 UNITS: 100 INJECTION, SOLUTION SUBCUTANEOUS at 01:20

## 2018-11-04 RX ADMIN — MEROPENEM 1 G: 1 INJECTION, POWDER, FOR SOLUTION INTRAVENOUS at 18:36

## 2018-11-04 ASSESSMENT — PAIN SCALES - GENERAL: PAINLEVEL_OUTOF10: 3

## 2018-11-04 NOTE — PROGRESS NOTES
osteoarthritis; Cervical pain; Femoral artery stenosis, left ([address] and Seizure ([address] on her problem list.    ONSET DATE:  11/3/18    Recent Chest Xray/CT of Chest: (11/3/18)  Right lower lobe atelectasis and patchy pneumonia. Date of Eval: 11/4/2018  Evaluating Therapist: Allie Holly    Current Diet level:  Current Diet : NPO  Current Liquid Diet : NPO    Primary Complaint  Patient Complaint: none    Pain:  Pain Assessment  Patient Currently in Pain: Denies    Reason for Referral  Rehana Gonzalez was referred for a bedside swallow evaluation to assess the efficiency of her swallow function, identify signs and symptoms of aspiration and make recommendations regarding safe dietary consistencies, effective compensatory strategies, and safe eating environment. Impression  Dysphagia Diagnosis: Swallow function appears grossly intact  Dysphagia Impression : Swallow function appears grossly intact. Pt demonstrates adequate labial seal and AP propulsion for liquids, puree and asoft solids. She demonstrates adequate mastication of soft solids despite edentulousness. No oral residue identified. No clinical s/s aspiraion identified with any consistency trialed. Pt was able to swallow 3 uninterrupted ounces of thin liquids via straw without clinical s/s aspiration. Recommend ST follow-up to assess diet tolerance. Recommend Dental Soft as a precaution, given pt's edentulousness and decreased level of laertness. Dysphagia Outcome Severity Scale: Level 6: Within functional limits/Modified independence     Treatment Plan  Requires SLP Intervention: Yes  Duration/Frequency of Treatment: 1-3x  D/C Recommendations: To be determined     Recommended Diet and Intervention  Diet Solids Recommendation: Dental Soft  Liquid Consistency Recommendation:  Thin  Recommended Form of Meds: PO  Therapeutic Interventions: Diet tolerance monitoring;Patient/Family education    Compensatory Swallowing Strategies  Compensatory

## 2018-11-04 NOTE — PROGRESS NOTES
Patient assisted to bed, family at bedside, attempted to orient patient to room and call light, patient confused and pulling at lines, Avasys, monitor on, patient denies pain, states she is thirsty

## 2018-11-04 NOTE — PROGRESS NOTES
wave inversions noted on EKG. Likely 2/2 CKD  - Troponin stable x4     Altered mental status  Waxing and waning AMS since stroke last month. Likely 2/2 to stroke but r/o metabolic causes. TSH, B12 normal. Ammonia 17. Likely 2/2 previous stroke  - Vit B1 pending     Elevated lactic/CK without acidosis; resolved  On admission lactate 11.96 and 4 after LR bolus. . Likely 2/2 to seizure. VBG normal.  - continue IVF NS @ 100mL/hr  - repeat lactate 1.3     DM2  - continue home lantus of 20U nightly  - medium SSI  - hypoglycemic protocol  - POCT glu qHS and AC     Anxiety  - continue home xanax     HLD  - continue home Atorvastatin    Prophylaxis: SCDs / protonix / ASA/plavix  Diet: DIET CARDIAC; Dental Soft  Code Status: Full Code    PT/OT Eval Status: Ordered    Dispo - Continue with general medicine team    I will discuss the patient with the senior resident and MD Maria R Yung MD  Internal Medicine Resident PGY-1  Pager: [phone]        Patient seen and examined, plan of care discussed with residents. Agree with their assessment and plan with following addendum:  Seen with son at bedside. No more seizure like activity since keppra. Following all commands, had to be reoriented once that she is in the hospital. Passed swallow eval. Neurology evaluation is pending. Cont with treating PNA with antitiobics. She is allergic to [facility] and cephalosporins.        Macarena Blakely

## 2018-11-05 ENCOUNTER — APPOINTMENT (OUTPATIENT)
Dept: GENERAL RADIOLOGY | Age: 68
DRG: 056 | End: 2018-11-05
Payer: COMMERCIAL

## 2018-11-05 LAB
ANION GAP SERPL CALCULATED.3IONS-SCNC: 13 MMOL/L (ref 3–16)
BASOPHILS ABSOLUTE: 0 K/UL (ref 0–0.2)
BASOPHILS RELATIVE PERCENT: 0.1 %
BUN BLDV-MCNC: 17 MG/DL (ref 7–20)
CALCIUM SERPL-MCNC: 8.6 MG/DL (ref 8.3–10.6)
CHLORIDE BLD-SCNC: 106 MMOL/L (ref 99–110)
CO2: 25 MMOL/L (ref 21–32)
CREAT SERPL-MCNC: 1.2 MG/DL (ref 0.6–1.2)
EOSINOPHILS ABSOLUTE: 0.3 K/UL (ref 0–0.6)
EOSINOPHILS RELATIVE PERCENT: 1.9 %
GFR AFRICAN AMERICAN: 54
GFR NON-AFRICAN AMERICAN: 44
GLUCOSE BLD-MCNC: 114 MG/DL (ref 70–99)
GLUCOSE BLD-MCNC: 119 MG/DL (ref 70–99)
GLUCOSE BLD-MCNC: 124 MG/DL (ref 70–99)
GLUCOSE BLD-MCNC: 134 MG/DL (ref 70–99)
GLUCOSE BLD-MCNC: 145 MG/DL (ref 70–99)
HCT VFR BLD CALC: 31.2 % (ref 36–48)
HEMOGLOBIN: 11.1 G/DL (ref 12–16)
LYMPHOCYTES ABSOLUTE: 1.1 K/UL (ref 1–5.1)
LYMPHOCYTES RELATIVE PERCENT: 5.9 %
MAGNESIUM: 2.2 MG/DL (ref 1.8–2.4)
MCH RBC QN AUTO: 32.9 PG (ref 26–34)
MCHC RBC AUTO-ENTMCNC: 35.7 G/DL (ref 31–36)
MCV RBC AUTO: 92.1 FL (ref 80–100)
MONOCYTES ABSOLUTE: 1.2 K/UL (ref 0–1.3)
MONOCYTES RELATIVE PERCENT: 7 %
NEUTROPHILS ABSOLUTE: 15.2 K/UL (ref 1.7–7.7)
NEUTROPHILS RELATIVE PERCENT: 85.1 %
PDW BLD-RTO: 13.7 % (ref 12.4–15.4)
PERFORMED ON: ABNORMAL
PLATELET # BLD: 214 K/UL (ref 135–450)
PMV BLD AUTO: 9 FL (ref 5–10.5)
POTASSIUM REFLEX MAGNESIUM: 3.2 MMOL/L (ref 3.5–5.1)
RBC # BLD: 3.39 M/UL (ref 4–5.2)
SODIUM BLD-SCNC: 144 MMOL/L (ref 136–145)
WBC # BLD: 17.9 K/UL (ref 4–11)

## 2018-11-05 PROCEDURE — 2580000003 HC RX 258: Performed by: EMERGENCY MEDICINE

## 2018-11-05 PROCEDURE — G8978 MOBILITY CURRENT STATUS: HCPCS

## 2018-11-05 PROCEDURE — 80048 BASIC METABOLIC PNL TOTAL CA: CPT

## 2018-11-05 PROCEDURE — 85025 COMPLETE CBC W/AUTO DIFF WBC: CPT

## 2018-11-05 PROCEDURE — 87040 BLOOD CULTURE FOR BACTERIA: CPT

## 2018-11-05 PROCEDURE — 6370000000 HC RX 637 (ALT 250 FOR IP): Performed by: INTERNAL MEDICINE

## 2018-11-05 PROCEDURE — 97163 PT EVAL HIGH COMPLEX 45 MIN: CPT

## 2018-11-05 PROCEDURE — 2500000003 HC RX 250 WO HCPCS: Performed by: STUDENT IN AN ORGANIZED HEALTH CARE EDUCATION/TRAINING PROGRAM

## 2018-11-05 PROCEDURE — 83735 ASSAY OF MAGNESIUM: CPT

## 2018-11-05 PROCEDURE — 97530 THERAPEUTIC ACTIVITIES: CPT

## 2018-11-05 PROCEDURE — 36415 COLL VENOUS BLD VENIPUNCTURE: CPT

## 2018-11-05 PROCEDURE — G8979 MOBILITY GOAL STATUS: HCPCS

## 2018-11-05 PROCEDURE — 2580000003 HC RX 258: Performed by: STUDENT IN AN ORGANIZED HEALTH CARE EDUCATION/TRAINING PROGRAM

## 2018-11-05 PROCEDURE — 95819 EEG AWAKE AND ASLEEP: CPT

## 2018-11-05 PROCEDURE — 6360000002 HC RX W HCPCS: Performed by: EMERGENCY MEDICINE

## 2018-11-05 PROCEDURE — 92526 ORAL FUNCTION THERAPY: CPT

## 2018-11-05 PROCEDURE — 6370000000 HC RX 637 (ALT 250 FOR IP): Performed by: STUDENT IN AN ORGANIZED HEALTH CARE EDUCATION/TRAINING PROGRAM

## 2018-11-05 PROCEDURE — 6360000002 HC RX W HCPCS: Performed by: STUDENT IN AN ORGANIZED HEALTH CARE EDUCATION/TRAINING PROGRAM

## 2018-11-05 PROCEDURE — 71045 X-RAY EXAM CHEST 1 VIEW: CPT

## 2018-11-05 PROCEDURE — 1200000000 HC SEMI PRIVATE

## 2018-11-05 RX ORDER — CARVEDILOL 25 MG/1
25 TABLET ORAL 2 TIMES DAILY WITH MEALS
Status: DISCONTINUED | OUTPATIENT
Start: 2018-11-05 | End: 2018-11-14 | Stop reason: HOSPADM

## 2018-11-05 RX ADMIN — MEROPENEM 1 G: 1 INJECTION, POWDER, FOR SOLUTION INTRAVENOUS at 17:31

## 2018-11-05 RX ADMIN — ESCITALOPRAM OXALATE 10 MG: 10 TABLET ORAL at 09:13

## 2018-11-05 RX ADMIN — LEVETIRACETAM 500 MG: 100 INJECTION, SOLUTION INTRAVENOUS at 05:12

## 2018-11-05 RX ADMIN — ALPRAZOLAM 0.25 MG: 0.25 TABLET ORAL at 09:13

## 2018-11-05 RX ADMIN — PANTOPRAZOLE SODIUM 40 MG: 40 TABLET, DELAYED RELEASE ORAL at 09:13

## 2018-11-05 RX ADMIN — CLOPIDOGREL BISULFATE 75 MG: 75 TABLET ORAL at 09:13

## 2018-11-05 RX ADMIN — INSULIN LISPRO 2 UNITS: 100 INJECTION, SOLUTION INTRAVENOUS; SUBCUTANEOUS at 11:59

## 2018-11-05 RX ADMIN — ALPRAZOLAM 0.25 MG: 0.25 TABLET ORAL at 13:38

## 2018-11-05 RX ADMIN — SODIUM CHLORIDE: 9 INJECTION, SOLUTION INTRAVENOUS at 02:36

## 2018-11-05 RX ADMIN — ATORVASTATIN CALCIUM 40 MG: 40 TABLET, FILM COATED ORAL at 21:36

## 2018-11-05 RX ADMIN — LEVETIRACETAM 500 MG: 100 INJECTION, SOLUTION INTRAVENOUS at 19:59

## 2018-11-05 RX ADMIN — ACETAMINOPHEN 650 MG: 325 TABLET, FILM COATED ORAL at 02:42

## 2018-11-05 RX ADMIN — CARVEDILOL 25 MG: 25 TABLET, FILM COATED ORAL at 12:02

## 2018-11-05 RX ADMIN — ASPIRIN 325 MG ORAL TABLET 324 MG: 325 PILL ORAL at 09:13

## 2018-11-05 RX ADMIN — CARVEDILOL 25 MG: 25 TABLET, FILM COATED ORAL at 17:31

## 2018-11-05 RX ADMIN — INSULIN GLARGINE 20 UNITS: 100 INJECTION, SOLUTION SUBCUTANEOUS at 22:08

## 2018-11-05 RX ADMIN — SODIUM CHLORIDE: 9 INJECTION, SOLUTION INTRAVENOUS at 13:40

## 2018-11-05 RX ADMIN — MEROPENEM 1 G: 1 INJECTION, POWDER, FOR SOLUTION INTRAVENOUS at 09:13

## 2018-11-05 RX ADMIN — ALPRAZOLAM 0.25 MG: 0.25 TABLET ORAL at 21:36

## 2018-11-05 RX ADMIN — LABETALOL HYDROCHLORIDE 10 MG: 5 INJECTION, SOLUTION INTRAVENOUS at 09:13

## 2018-11-05 RX ADMIN — MEROPENEM 1 G: 1 INJECTION, POWDER, FOR SOLUTION INTRAVENOUS at 02:35

## 2018-11-05 ASSESSMENT — PAIN SCALES - GENERAL
PAINLEVEL_OUTOF10: 3
PAINLEVEL_OUTOF10: 0

## 2018-11-05 NOTE — PLAN OF CARE
Problem: Neurological  Intervention: PT Evaluation/treatment  Increase safety and independence with functional mobility.

## 2018-11-05 NOTE — PROGRESS NOTES
the time of my visit.     A copy of this note was provided for Dr Agnes Vegas, MD Mary Maier, NP  Neurology  [phone]  Evenings, weekends, and off weeks please discuss with the covering neurologist.

## 2018-11-05 NOTE — PROGRESS NOTES
Patient has been confused and restless most of the night, frequently pulls of telemetry and oxygen, medical resident paged, OK to leave patient off telemetry for now per on-call medical Birdena Hair catheter placed, patient seems agreeable to dionnakmarquise on, bed alarm on, will continue to monitor

## 2018-11-05 NOTE — PLAN OF CARE
Problem: Falls - Risk of:  Goal: Will remain free from falls  Will remain free from falls   Pt free from injury or falls at this time, fall precautions in place, bed in low position, side rail up x2, Garcia Fall Risk: High (45 and higher), bed alarm on, reoriented to room and call light, reminded not to get up without assistance, Avasys on, call light in reach, will continue to monitor. Pt verbalizes understanding of fall risk procedures.       Problem: HEMODYNAMIC STATUS  Goal: Patient has stable vital signs and fluid balance  B/p elevated but patient is also very restless

## 2018-11-05 NOTE — PROGRESS NOTES
Speech Language Pathology  Facility/Department: [facility] 5T ORTHO/NEURO  Dysphagia Daily Treatment Note / DISCHARGE    NAME: Virgie Hansen  : 1950  MRN: [id number]    Patient Diagnosis(es):   Patient Active Problem List    Diagnosis Date Noted    Seizure [facility]) 2018    Femoral artery stenosis, left (HCC)     S/P vascular surgery     Pure hypercholesterolemia     PAD (peripheral artery disease) ([address]     S/P PTCA (percutaneous transluminal coronary angioplasty)     Type 2 diabetes mellitus with diabetic peripheral angiopathy without gangrene ([address]     Infection of vascular bypass graft ([address] 2016    Cellulitis of lower extremity 2016    Cervical pain 2016    Left leg pain 2016    Leukocytosis     Abdominal pain, right lower quadrant     Cancer of colon ([address] 2015    Type II or unspecified type diabetes mellitus with peripheral circulatory disorders, not stated as uncontrolled(250.70) 10/24/2014    Type II or unspecified type diabetes mellitus with renal manifestations, not stated as uncontrolled(250.40) 10/24/2014    Type II or unspecified type diabetes mellitus with other specified manifestations, not stated as uncontrolled 10/24/2014    Drug dependence ([address] 10/22/2014    Major depressive disorder, single episode 10/22/2014    Lower limb amputation, other toe(s) 10/22/2014    Type II or unspecified type diabetes mellitus with neurological manifestations, not stated as uncontrolled(250.60) 10/22/2014    Diabetic polyneuropathy ([address] 10/22/2014    Morbid obesity ([address] 10/22/2014    Pulmonary emphysema ([address] 10/22/2014    Atherosclerosis of aorta ([address] 10/22/2014    Carotid bruit (Right) 2014    Unstable angina ([address] 2014    Abdominal pain 2013    Pedal edema 10/31/2013    Chest pain 2013    Chronic kidney disease, stage III (moderate) ([address] 2012    Melena 2011    Vitamin D deficiency 10/24/2011    Hyperlipidemia, unspecified 04/25/2010    Coronary artery disease involving native coronary artery of native heart without angina pectoris 04/25/2010    Essential hypertension 04/25/2010    Peripheral vascular disease ([facility] 04/25/2010    Type II or unspecified type diabetes mellitus without mention of complication, not stated as uncontrolled 04/25/2010    Fibromyalgia 04/25/2010    Lumbar spinal stenosis 04/25/2010    Adrenal nodule ([facility] 04/25/2010    Bacterial infection due to Staphylococcus aureus 04/25/2010    Osteopenia 04/25/2010    Degeneration of intervertebral disc of lumbosacral region 02/19/2007    Lumbar and sacral osteoarthritis 02/19/2007     Allergies: Allergies   Allergen Reactions    Latex Dermatitis     Raw area    Adhesive Tape Dermatitis     Raw skin, tears    Cephalexin Shortness Of Breath, Swelling and Other (See Comments)     (KEFLEX)  LIPS AND TONGUE SWELLING    Chocolate Swelling    Peanut-Containing Drug Products Swelling    Ativan [Lorazepam] Other (See Comments)     AMS    Codeine Swelling    Dilaudid [Hydromorphone Hcl] Other (See Comments)     See things    Penicillins Swelling     Chart reviewed. ONSET DATE:  11/3/18    Medical Diagnosis:  seizure  Treatment Diagnosis:  Oral-pharyngeal Dysphagia (R13.12)    BSE Impression (11/4/18):  Swallow function appears grossly intact. Pt demonstrates adequate labial seal and AP propulsion for liquids, puree and asoft solids. She demonstrates adequate mastication of soft solids despite edentulousness. No oral residue identified. No clinical s/s aspiraion identified with any consistency trialed. Pt was able to swallow 3 uninterrupted ounces of thin liquids via straw without clinical s/s aspiration. Recommend ST follow-up to assess diet tolerance. Recommend Dental Soft as a precaution, given pt's edentulousness and decreased level of laertness.     MBS results:  none    Pain: denied    Current Diet:  Diet Solids:

## 2018-11-05 NOTE — PROGRESS NOTES
No acute abnormality or abnormal fluid collection. CT Head WO Contrast   Final Result      1. Subacute infarct in the right occipital lobe. 2.  No acute intracranial hemorrhage or mass effect. MRI BRAIN W WO CONTRAST    (Results Pending)           Assessment/Plan:    78 y/o female with PMH significant for recent stroke, colon Ca s/p resection 2016 who presents from home with witnessed seizure  Active Hospital Problems    Diagnosis Date Noted    Seizure [facility]) [R56.9] 11/03/2018     Seizure  CT head with no acute intracranial changes. Lactate 11.96 and 4 after LR bolus. . New-onset seizure likely 2/2 vascular etiology as pt recently had a stroke (on ASA and plavix) vs infectious vs malignancy (hx of colon ca and recent wt loss)  - continue keppra 500mg IV  - continue ASA and plavix  - neurology consulted; MRI and EEG pending  - seizure precautions in place  - SLP cleared for diet  - f/u sputum culture     Aspirational PNA  Likely 2/2 to aspiration during seizure event. Influenza A/B antigen negative. - Procalcitonin . 27  - CBC with increasing WBC count  - Repeat UA, CXR, BCx x 2.   - continue meropenem (Day 3)     HTN  Patient has Hx of being on lisinopril and coreg in the past, but was not on her med rec. SBP elevated to the 190s  - Discussed with family that patient's home coreg and lisinopril were stopped d/t hypotension  - Restarted Coreg 25mg BID and will monitor   - Can restart Lisinopril 40mg if patient remains hypertensive     Troponemia  On admission trop 0.03 and 0.04 on repeat. Hx of CKD 3 with elevated trop. No ST changes or T wave inversions noted on EKG. Likely 2/2 CKD  - Troponin stable x4     Acute Encephalopathy  Waxing and waning AMS since stroke last month. Likely 2/2 to stroke but r/o metabolic causes. TSH, B12 normal. Ammonia 17.   Likely 2/2 previous stroke  - Vit B1 pending     Elevated lactic/CK without acidosis; resolved  On admission lactate 11.96 and 4 after LR bolus. . Likely 2/2 to seizure. VBG normal.  - continue IVF NS @ 100mL/hr  - repeat lactate 1.3     DM2  - continue home lantus of 20U nightly  - medium SSI  - hypoglycemic protocol  - POCT glu qHS and AC     Anxiety  - continue home xanax     HLD  - continue home Atorvastatin    DVT Prophylaxis: scd  Diet: DIET CARDIAC;  Dental Soft  Dietary Nutrition Supplements: Standard High Calorie Oral Supplement  Code Status: Full Code    PT/OT Eval Status: N/a    Dispo - Floor    I will discuss the patient with the senior resident and Natalie Libman, MD Nathanial Drummer, MD.   Internal Medicine Resident PGY-1  Pager: [phone]

## 2018-11-06 ENCOUNTER — APPOINTMENT (OUTPATIENT)
Dept: MRI IMAGING | Age: 68
DRG: 056 | End: 2018-11-06
Payer: COMMERCIAL

## 2018-11-06 LAB
ANION GAP SERPL CALCULATED.3IONS-SCNC: 12 MMOL/L (ref 3–16)
BASOPHILS ABSOLUTE: 0.1 K/UL (ref 0–0.2)
BASOPHILS RELATIVE PERCENT: 0.5 %
BUN BLDV-MCNC: 15 MG/DL (ref 7–20)
CALCIUM SERPL-MCNC: 8.3 MG/DL (ref 8.3–10.6)
CHLORIDE BLD-SCNC: 105 MMOL/L (ref 99–110)
CO2: 25 MMOL/L (ref 21–32)
CREAT SERPL-MCNC: 0.9 MG/DL (ref 0.6–1.2)
EOSINOPHILS ABSOLUTE: 0 K/UL (ref 0–0.6)
EOSINOPHILS RELATIVE PERCENT: 0.3 %
GFR AFRICAN AMERICAN: >60
GFR NON-AFRICAN AMERICAN: >60
GLUCOSE BLD-MCNC: 114 MG/DL (ref 70–99)
GLUCOSE BLD-MCNC: 116 MG/DL (ref 70–99)
GLUCOSE BLD-MCNC: 118 MG/DL (ref 70–99)
GLUCOSE BLD-MCNC: 137 MG/DL (ref 70–99)
GLUCOSE BLD-MCNC: 181 MG/DL (ref 70–99)
HCT VFR BLD CALC: 29.4 % (ref 36–48)
HEMOGLOBIN: 10.3 G/DL (ref 12–16)
LYMPHOCYTES ABSOLUTE: 1 K/UL (ref 1–5.1)
LYMPHOCYTES RELATIVE PERCENT: 7.7 %
MAGNESIUM: 1.9 MG/DL (ref 1.8–2.4)
MCH RBC QN AUTO: 32.3 PG (ref 26–34)
MCHC RBC AUTO-ENTMCNC: 34.9 G/DL (ref 31–36)
MCV RBC AUTO: 92.4 FL (ref 80–100)
MONOCYTES ABSOLUTE: 0.9 K/UL (ref 0–1.3)
MONOCYTES RELATIVE PERCENT: 6.8 %
NEUTROPHILS ABSOLUTE: 10.9 K/UL (ref 1.7–7.7)
NEUTROPHILS RELATIVE PERCENT: 84.7 %
PDW BLD-RTO: 14.2 % (ref 12.4–15.4)
PERFORMED ON: ABNORMAL
PLATELET # BLD: 175 K/UL (ref 135–450)
PMV BLD AUTO: 9.2 FL (ref 5–10.5)
POTASSIUM REFLEX MAGNESIUM: 3.3 MMOL/L (ref 3.5–5.1)
RBC # BLD: 3.18 M/UL (ref 4–5.2)
SODIUM BLD-SCNC: 142 MMOL/L (ref 136–145)
WBC # BLD: 12.8 K/UL (ref 4–11)

## 2018-11-06 PROCEDURE — 36415 COLL VENOUS BLD VENIPUNCTURE: CPT

## 2018-11-06 PROCEDURE — 6370000000 HC RX 637 (ALT 250 FOR IP): Performed by: STUDENT IN AN ORGANIZED HEALTH CARE EDUCATION/TRAINING PROGRAM

## 2018-11-06 PROCEDURE — 6370000000 HC RX 637 (ALT 250 FOR IP): Performed by: INTERNAL MEDICINE

## 2018-11-06 PROCEDURE — 6360000002 HC RX W HCPCS: Performed by: STUDENT IN AN ORGANIZED HEALTH CARE EDUCATION/TRAINING PROGRAM

## 2018-11-06 PROCEDURE — 2580000003 HC RX 258: Performed by: EMERGENCY MEDICINE

## 2018-11-06 PROCEDURE — 2580000003 HC RX 258: Performed by: STUDENT IN AN ORGANIZED HEALTH CARE EDUCATION/TRAINING PROGRAM

## 2018-11-06 PROCEDURE — 80048 BASIC METABOLIC PNL TOTAL CA: CPT

## 2018-11-06 PROCEDURE — 6360000002 HC RX W HCPCS: Performed by: EMERGENCY MEDICINE

## 2018-11-06 PROCEDURE — 85025 COMPLETE CBC W/AUTO DIFF WBC: CPT

## 2018-11-06 PROCEDURE — 83735 ASSAY OF MAGNESIUM: CPT

## 2018-11-06 PROCEDURE — 1200000000 HC SEMI PRIVATE

## 2018-11-06 PROCEDURE — 6360000002 HC RX W HCPCS: Performed by: INTERNAL MEDICINE

## 2018-11-06 RX ORDER — LORAZEPAM 2 MG/ML
0.5 INJECTION INTRAMUSCULAR
Status: COMPLETED | OUTPATIENT
Start: 2018-11-06 | End: 2018-11-06

## 2018-11-06 RX ORDER — POTASSIUM CHLORIDE 20 MEQ/1
40 TABLET, EXTENDED RELEASE ORAL 2 TIMES DAILY WITH MEALS
Status: DISCONTINUED | OUTPATIENT
Start: 2018-11-06 | End: 2018-11-08

## 2018-11-06 RX ORDER — CYANOCOBALAMIN 1000 UG/ML
1000 INJECTION INTRAMUSCULAR; SUBCUTANEOUS DAILY
Status: COMPLETED | OUTPATIENT
Start: 2018-11-06 | End: 2018-11-12

## 2018-11-06 RX ORDER — LORAZEPAM 2 MG/ML
0.5 INJECTION INTRAMUSCULAR
Status: DISCONTINUED | OUTPATIENT
Start: 2018-11-06 | End: 2018-11-06

## 2018-11-06 RX ORDER — LISINOPRIL 40 MG/1
40 TABLET ORAL DAILY
Status: DISCONTINUED | OUTPATIENT
Start: 2018-11-06 | End: 2018-11-09

## 2018-11-06 RX ORDER — LISINOPRIL 40 MG/1
40 TABLET ORAL ONCE
Status: DISCONTINUED | OUTPATIENT
Start: 2018-11-06 | End: 2018-11-06

## 2018-11-06 RX ADMIN — CYANOCOBALAMIN 1000 MCG: 1000 INJECTION, SOLUTION INTRAMUSCULAR; SUBCUTANEOUS at 20:11

## 2018-11-06 RX ADMIN — ACETAMINOPHEN 650 MG: 325 TABLET, FILM COATED ORAL at 18:00

## 2018-11-06 RX ADMIN — ACETAMINOPHEN 650 MG: 325 TABLET, FILM COATED ORAL at 23:28

## 2018-11-06 RX ADMIN — ASPIRIN 325 MG ORAL TABLET 324 MG: 325 PILL ORAL at 08:58

## 2018-11-06 RX ADMIN — MEROPENEM 1 G: 1 INJECTION, POWDER, FOR SOLUTION INTRAVENOUS at 18:50

## 2018-11-06 RX ADMIN — INSULIN GLARGINE 20 UNITS: 100 INJECTION, SOLUTION SUBCUTANEOUS at 20:49

## 2018-11-06 RX ADMIN — LORAZEPAM 0.5 MG: 2 INJECTION INTRAMUSCULAR; INTRAVENOUS at 18:00

## 2018-11-06 RX ADMIN — PANTOPRAZOLE SODIUM 40 MG: 40 TABLET, DELAYED RELEASE ORAL at 08:58

## 2018-11-06 RX ADMIN — POTASSIUM CHLORIDE 40 MEQ: 20 TABLET, EXTENDED RELEASE ORAL at 18:00

## 2018-11-06 RX ADMIN — ALPRAZOLAM 0.25 MG: 0.25 TABLET ORAL at 20:12

## 2018-11-06 RX ADMIN — MEROPENEM 1 G: 1 INJECTION, POWDER, FOR SOLUTION INTRAVENOUS at 02:07

## 2018-11-06 RX ADMIN — POTASSIUM CHLORIDE 40 MEQ: 20 TABLET, EXTENDED RELEASE ORAL at 12:07

## 2018-11-06 RX ADMIN — CARVEDILOL 25 MG: 25 TABLET, FILM COATED ORAL at 08:58

## 2018-11-06 RX ADMIN — CLOPIDOGREL BISULFATE 75 MG: 75 TABLET ORAL at 08:58

## 2018-11-06 RX ADMIN — INSULIN LISPRO 2 UNITS: 100 INJECTION, SOLUTION INTRAVENOUS; SUBCUTANEOUS at 12:52

## 2018-11-06 RX ADMIN — ESCITALOPRAM OXALATE 10 MG: 10 TABLET ORAL at 08:58

## 2018-11-06 RX ADMIN — ALPRAZOLAM 0.25 MG: 0.25 TABLET ORAL at 08:58

## 2018-11-06 RX ADMIN — ALPRAZOLAM 0.25 MG: 0.25 TABLET ORAL at 15:00

## 2018-11-06 RX ADMIN — LEVETIRACETAM 500 MG: 100 INJECTION, SOLUTION INTRAVENOUS at 20:11

## 2018-11-06 RX ADMIN — LISINOPRIL 40 MG: 40 TABLET ORAL at 08:58

## 2018-11-06 RX ADMIN — CARVEDILOL 25 MG: 25 TABLET, FILM COATED ORAL at 17:59

## 2018-11-06 RX ADMIN — MEROPENEM 1 G: 1 INJECTION, POWDER, FOR SOLUTION INTRAVENOUS at 09:13

## 2018-11-06 RX ADMIN — LEVETIRACETAM 500 MG: 100 INJECTION, SOLUTION INTRAVENOUS at 07:45

## 2018-11-06 RX ADMIN — ATORVASTATIN CALCIUM 40 MG: 40 TABLET, FILM COATED ORAL at 20:18

## 2018-11-06 ASSESSMENT — PAIN DESCRIPTION - DESCRIPTORS
DESCRIPTORS: ACHING
DESCRIPTORS: ACHING

## 2018-11-06 ASSESSMENT — PAIN DESCRIPTION - ORIENTATION
ORIENTATION: LOWER
ORIENTATION: LOWER

## 2018-11-06 ASSESSMENT — PAIN DESCRIPTION - ONSET
ONSET: ON-GOING
ONSET: ON-GOING

## 2018-11-06 ASSESSMENT — PAIN DESCRIPTION - PROGRESSION
CLINICAL_PROGRESSION: GRADUALLY WORSENING
CLINICAL_PROGRESSION: NOT CHANGED

## 2018-11-06 ASSESSMENT — PAIN SCALES - GENERAL
PAINLEVEL_OUTOF10: 0
PAINLEVEL_OUTOF10: 0
PAINLEVEL_OUTOF10: 3
PAINLEVEL_OUTOF10: 3

## 2018-11-06 ASSESSMENT — PAIN DESCRIPTION - FREQUENCY
FREQUENCY: INTERMITTENT
FREQUENCY: INTERMITTENT

## 2018-11-06 ASSESSMENT — PAIN DESCRIPTION - PAIN TYPE
TYPE: CHRONIC PAIN
TYPE: CHRONIC PAIN

## 2018-11-06 ASSESSMENT — PAIN DESCRIPTION - LOCATION
LOCATION: BACK
LOCATION: BACK

## 2018-11-06 NOTE — PROGRESS NOTES
sounds. Musculoskeletal: No clubbing, cyanosis or edema bilaterally. Full range of motion without deformity. Skin: Skin color, texture, turgor normal.  No rashes or lesions. Neurologic:  Neurovascularly intact without any sensory deficits. LUE weakness. Pronator drift present on left. CN II-XII grossly intact. Alert and oriented to person  Peripheral Pulses: +2 palpable, equal bilaterally       Labs:   Recent Labs      11/04/18   0310  11/05/18   0613  11/06/18   0850   WBC  15.9*  17.9*  12.8*   HGB  11.2*  11.1*  10.3*   HCT  32.1*  31.2*  29.4*   PLT  193  214  175     Recent Labs      11/04/18   0310  11/05/18   0613  11/06/18   0850   NA  134*  144  142   K  3.8  3.2*  3.3*   CL  97*  106  105   CO2  23  25  25   BUN  17  17  15   CREATININE  1.3*  1.2  0.9   CALCIUM  8.7  8.6  8.3     No results for input(s): AST, ALT, BILIDIR, BILITOT, ALKPHOS in the last 72 hours. No results for input(s): INR in the last 72 hours. Recent Labs      11/04/18   0008  11/04/18   0310   CKTOTAL  202*   --    TROPONINI  0.04*  0.04*       Urinalysis:      Lab Results   Component Value Date    NITRU Negative 11/03/2018    WBCUA 3-5 11/03/2018    BACTERIA 2+ 11/03/2018    RBCUA 3-5 11/03/2018    BLOODU SMALL 11/03/2018    SPECGRAV 1.025 11/03/2018    GLUCOSEU 250 11/03/2018    GLUCOSEU Negative 11/30/2011       Radiology:  XR CHEST PORTABLE   Final Result      1. Mild new consolidation of the right mid and lower lung zones. 2. Sclerotic lesion of the right humeral head progressed dating back to chest x-ray from 9/10/2010, favored to be benign. XR CHEST PORTABLE   Final Result      Right lower lobe atelectasis and patchy pneumonia. CT ABDOMEN PELVIS W IV CONTRAST Additional Contrast? None   Final Result      No change in 3 cm x 4.2 cm right adrenal mass. Extensive arterial wall calcification. No change in small aneurysm left common iliac artery. No abnormal bowel dilatation or wall thickening.

## 2018-11-06 NOTE — PROGRESS NOTES
Neurology Progress Note    Updates:  Remains hypertensive  No major changes clinically    Exam:  Blood pressure (!) 182/52, pulse 78, temperature 98.2 °F (36.8 °C), temperature source Oral, resp. rate 18, height 5' 5\" (1.651 m), weight 190 lb (86.2 kg), SpO2 96 %, not currently breastfeeding. Constitutional    Vital signs: BP, HR, and RR reviewed   General drowsy, no distress, well-nourished  Eyes: unable to visualize fundi   Cardiovascular: pulses symmetric in all 4 extremities. No peripheral edema. Psychiatric: cooperative with examination, no  psychotic behavior noted. Neurologic  Mental status: drowsy - eyes open to voice  orientation to person and year. Disoriented to place and month   General fund of knowledge able to tell me 2 quarters is 50 cents. Names \"Lino\" as President    Attention decreased but able to follow commands and regard examiner   Language fluent in conversation   Comprehension intact; follows simple commands (show me two fingers, stick out your tongue)  Cranial nerves:   CN2: Visual Fields difficulty with both visual fields   CN 3,4,6: extraocular muscles intact; pupils equal, round and reactive  CN7: face symmetric without dysarthria  CN8: hearing grossly intact  CN12: tongue midline with protrusion  Strength: No prontator drift. Good strength in all 4 extremities     Labs:  Creatinine 1.2 mg/dL  Glucose 114 mg/dL  Tox screen negative  WBC 17.9K  Blood cultures x 2 in process    Studies:  CT head: subacute infarct right occipital lobe. No ICH or mass effect. EKG: NSR    EEG:  - Mild generalized slowing  - FIRDA (Frontal intermittent rhythmic delta activity) which is a   non-specific finding that can be secondary to a variety of   etiologies including toxic, metabolic, post-hypoxic, and   degenerative processes as well as deep midline structural   abnormalities.     Scheduled Medications   lisinopril  40 mg Oral Daily    carvedilol  25 mg Oral BID WC    levetiracetam  500 mg Intravenous Q12H    pantoprazole  40 mg Oral QAM AC    meropenem  1 g Intravenous Q8H    lidocaine PF  5 mL Intradermal Once    ALPRAZolam  0.25 mg Oral TID    atorvastatin  40 mg Oral Nightly    clopidogrel  75 mg Oral Daily    escitalopram  10 mg Oral Daily    aspirin  324 mg Oral Daily    insulin glargine  20 Units Subcutaneous Nightly    insulin lispro  0-12 Units Subcutaneous TID WC    insulin lispro  0-6 Units Subcutaneous Nightly     Vitals:    11/05/18 2256 11/06/18 0345 11/06/18 0615 11/06/18 0754   BP: (!) 172/65 (!) 180/87 (!) 165/76 (!) 182/52   Pulse: 85 90  78   Resp: 16 16  18   Temp: 97.5 °F (36.4 °C) 97.6 °F (36.4 °C)  98.2 °F (36.8 °C)   TempSrc: Oral Axillary  Oral   SpO2: 95% 94%  96%   Weight:       Height:         Impression:  Rosana Vuong is a 77 y.o. female with recurrent seizures and encephalopathy in the setting of recent right PCA stroke, bilateral carotid stenosis, and hypertension. Prior stroke could be etiology of seizure as well as hypertension. Given new visual field deficits, new stroke is another possibility. EEG with mild generalized slowing and FIRDA.     Recommendations:  - MRI brain w/wo bernard - ordered 11/5/18  - BP goal less than 160/90 mmHg  - Continue Keppra 500 mg BID, aspirin, Plavix, and Lipitor  - Pending results of above CSF may be needed    A copy of this note was provided for Dr Adán Cuenca, MD Eligio Monge, NP  Neurology  [phone]  Evenings, weekends, and off weeks please discuss with the covering neurologist.

## 2018-11-06 NOTE — PLAN OF CARE
Problem: Falls - Risk of:  Goal: Will remain free from falls  Will remain free from falls   Outcome: Ongoing  Patient high fall risk and is up with assist x2 with the stedy. Patient alert and oriented x2, non skid socks on, bed in lowest position and locked, side rails up x2, call light and belongings within reach, bed alarm on for safety, fall sign posted. Will continue to monitor. Problem: HEMODYNAMIC STATUS  Goal: Patient has stable vital signs and fluid balance  Outcome: Ongoing  VSS, patient continues to be confused but is oriented to self. Patient has trouble seeing and complains of it being worse in her right eye, PERRLA. Stroke score 4. Patient to go to MRI later today. Will continue to monitor. Problem: Pain:  Goal: Pain level will decrease  Pain level will decrease   Outcome: Ongoing  Pt denies pain. Will continue to monitor.

## 2018-11-07 ENCOUNTER — APPOINTMENT (OUTPATIENT)
Dept: MRI IMAGING | Age: 68
DRG: 056 | End: 2018-11-07
Payer: COMMERCIAL

## 2018-11-07 LAB
ANION GAP SERPL CALCULATED.3IONS-SCNC: 8 MMOL/L (ref 3–16)
BASOPHILS ABSOLUTE: 0.1 K/UL (ref 0–0.2)
BASOPHILS RELATIVE PERCENT: 0.9 %
BUN BLDV-MCNC: 16 MG/DL (ref 7–20)
CALCIUM SERPL-MCNC: 8 MG/DL (ref 8.3–10.6)
CHLORIDE BLD-SCNC: 110 MMOL/L (ref 99–110)
CO2: 25 MMOL/L (ref 21–32)
CREAT SERPL-MCNC: 1 MG/DL (ref 0.6–1.2)
EOSINOPHILS ABSOLUTE: 0.7 K/UL (ref 0–0.6)
EOSINOPHILS RELATIVE PERCENT: 7.8 %
GFR AFRICAN AMERICAN: >60
GFR NON-AFRICAN AMERICAN: 55
GLUCOSE BLD-MCNC: 129 MG/DL (ref 70–99)
GLUCOSE BLD-MCNC: 170 MG/DL (ref 70–99)
GLUCOSE BLD-MCNC: 172 MG/DL (ref 70–99)
GLUCOSE BLD-MCNC: 84 MG/DL (ref 70–99)
GLUCOSE BLD-MCNC: 96 MG/DL (ref 70–99)
HCT VFR BLD CALC: 26.3 % (ref 36–48)
HEMOGLOBIN: 9.3 G/DL (ref 12–16)
LYMPHOCYTES ABSOLUTE: 1.3 K/UL (ref 1–5.1)
LYMPHOCYTES RELATIVE PERCENT: 14.2 %
MCH RBC QN AUTO: 32.9 PG (ref 26–34)
MCHC RBC AUTO-ENTMCNC: 35.4 G/DL (ref 31–36)
MCV RBC AUTO: 93 FL (ref 80–100)
MONOCYTES ABSOLUTE: 0.8 K/UL (ref 0–1.3)
MONOCYTES RELATIVE PERCENT: 9 %
NEUTROPHILS ABSOLUTE: 6.3 K/UL (ref 1.7–7.7)
NEUTROPHILS RELATIVE PERCENT: 68.1 %
PDW BLD-RTO: 14.2 % (ref 12.4–15.4)
PERFORMED ON: ABNORMAL
PERFORMED ON: NORMAL
PLATELET # BLD: 165 K/UL (ref 135–450)
PMV BLD AUTO: 9.2 FL (ref 5–10.5)
POTASSIUM REFLEX MAGNESIUM: 3.7 MMOL/L (ref 3.5–5.1)
RBC # BLD: 2.83 M/UL (ref 4–5.2)
SODIUM BLD-SCNC: 143 MMOL/L (ref 136–145)
WBC # BLD: 9.2 K/UL (ref 4–11)

## 2018-11-07 PROCEDURE — 2580000003 HC RX 258: Performed by: STUDENT IN AN ORGANIZED HEALTH CARE EDUCATION/TRAINING PROGRAM

## 2018-11-07 PROCEDURE — G8988 SELF CARE GOAL STATUS: HCPCS

## 2018-11-07 PROCEDURE — 6370000000 HC RX 637 (ALT 250 FOR IP): Performed by: STUDENT IN AN ORGANIZED HEALTH CARE EDUCATION/TRAINING PROGRAM

## 2018-11-07 PROCEDURE — 97530 THERAPEUTIC ACTIVITIES: CPT

## 2018-11-07 PROCEDURE — 97116 GAIT TRAINING THERAPY: CPT

## 2018-11-07 PROCEDURE — 85025 COMPLETE CBC W/AUTO DIFF WBC: CPT

## 2018-11-07 PROCEDURE — 2580000003 HC RX 258: Performed by: EMERGENCY MEDICINE

## 2018-11-07 PROCEDURE — 94761 N-INVAS EAR/PLS OXIMETRY MLT: CPT

## 2018-11-07 PROCEDURE — G8987 SELF CARE CURRENT STATUS: HCPCS

## 2018-11-07 PROCEDURE — 6360000002 HC RX W HCPCS: Performed by: STUDENT IN AN ORGANIZED HEALTH CARE EDUCATION/TRAINING PROGRAM

## 2018-11-07 PROCEDURE — 2700000000 HC OXYGEN THERAPY PER DAY

## 2018-11-07 PROCEDURE — 6370000000 HC RX 637 (ALT 250 FOR IP): Performed by: INTERNAL MEDICINE

## 2018-11-07 PROCEDURE — 36415 COLL VENOUS BLD VENIPUNCTURE: CPT

## 2018-11-07 PROCEDURE — 1200000000 HC SEMI PRIVATE

## 2018-11-07 PROCEDURE — 6360000002 HC RX W HCPCS: Performed by: INTERNAL MEDICINE

## 2018-11-07 PROCEDURE — 97535 SELF CARE MNGMENT TRAINING: CPT

## 2018-11-07 PROCEDURE — 80048 BASIC METABOLIC PNL TOTAL CA: CPT

## 2018-11-07 PROCEDURE — 97166 OT EVAL MOD COMPLEX 45 MIN: CPT

## 2018-11-07 PROCEDURE — 6360000002 HC RX W HCPCS: Performed by: EMERGENCY MEDICINE

## 2018-11-07 RX ORDER — MORPHINE SULFATE 2 MG/ML
2 INJECTION, SOLUTION INTRAMUSCULAR; INTRAVENOUS ONCE
Status: COMPLETED | OUTPATIENT
Start: 2018-11-07 | End: 2018-11-07

## 2018-11-07 RX ORDER — MORPHINE SULFATE 2 MG/ML
4 INJECTION, SOLUTION INTRAMUSCULAR; INTRAVENOUS ONCE
Status: COMPLETED | OUTPATIENT
Start: 2018-11-07 | End: 2018-11-07

## 2018-11-07 RX ORDER — HALOPERIDOL 5 MG/ML
2 INJECTION INTRAMUSCULAR ONCE
Status: COMPLETED | OUTPATIENT
Start: 2018-11-07 | End: 2018-11-07

## 2018-11-07 RX ORDER — OXYCODONE AND ACETAMINOPHEN 7.5; 325 MG/1; MG/1
1 TABLET ORAL
Status: COMPLETED | OUTPATIENT
Start: 2018-11-07 | End: 2018-11-07

## 2018-11-07 RX ORDER — AMLODIPINE BESYLATE 5 MG/1
5 TABLET ORAL NIGHTLY
Status: DISCONTINUED | OUTPATIENT
Start: 2018-11-07 | End: 2018-11-14 | Stop reason: HOSPADM

## 2018-11-07 RX ADMIN — OXYCODONE HYDROCHLORIDE AND ACETAMINOPHEN 1 TABLET: 7.5; 325 TABLET ORAL at 13:05

## 2018-11-07 RX ADMIN — LEVETIRACETAM 500 MG: 100 INJECTION, SOLUTION INTRAVENOUS at 09:27

## 2018-11-07 RX ADMIN — HALOPERIDOL LACTATE 2 MG: 5 INJECTION, SOLUTION INTRAMUSCULAR at 17:18

## 2018-11-07 RX ADMIN — CYANOCOBALAMIN 1000 MCG: 1000 INJECTION, SOLUTION INTRAMUSCULAR; SUBCUTANEOUS at 09:36

## 2018-11-07 RX ADMIN — LISINOPRIL 40 MG: 40 TABLET ORAL at 09:20

## 2018-11-07 RX ADMIN — AMLODIPINE BESYLATE 5 MG: 5 TABLET ORAL at 21:23

## 2018-11-07 RX ADMIN — INSULIN LISPRO 1 UNITS: 100 INJECTION, SOLUTION INTRAVENOUS; SUBCUTANEOUS at 22:36

## 2018-11-07 RX ADMIN — ALPRAZOLAM 0.25 MG: 0.25 TABLET ORAL at 09:20

## 2018-11-07 RX ADMIN — POTASSIUM CHLORIDE 40 MEQ: 20 TABLET, EXTENDED RELEASE ORAL at 21:23

## 2018-11-07 RX ADMIN — MORPHINE SULFATE 2 MG: 2 INJECTION, SOLUTION INTRAMUSCULAR; INTRAVENOUS at 17:18

## 2018-11-07 RX ADMIN — POTASSIUM CHLORIDE 40 MEQ: 20 TABLET, EXTENDED RELEASE ORAL at 09:40

## 2018-11-07 RX ADMIN — CARVEDILOL 25 MG: 25 TABLET, FILM COATED ORAL at 21:23

## 2018-11-07 RX ADMIN — CLOPIDOGREL BISULFATE 75 MG: 75 TABLET ORAL at 09:20

## 2018-11-07 RX ADMIN — ASPIRIN 325 MG ORAL TABLET 324 MG: 325 PILL ORAL at 09:21

## 2018-11-07 RX ADMIN — PANTOPRAZOLE SODIUM 40 MG: 40 TABLET, DELAYED RELEASE ORAL at 06:04

## 2018-11-07 RX ADMIN — MEROPENEM 1 G: 1 INJECTION, POWDER, FOR SOLUTION INTRAVENOUS at 02:46

## 2018-11-07 RX ADMIN — ESCITALOPRAM OXALATE 10 MG: 10 TABLET ORAL at 09:19

## 2018-11-07 RX ADMIN — ACETAMINOPHEN 650 MG: 325 TABLET, FILM COATED ORAL at 09:20

## 2018-11-07 RX ADMIN — ATORVASTATIN CALCIUM 40 MG: 40 TABLET, FILM COATED ORAL at 22:40

## 2018-11-07 RX ADMIN — SODIUM CHLORIDE: 9 INJECTION, SOLUTION INTRAVENOUS at 06:46

## 2018-11-07 RX ADMIN — MEROPENEM 1 G: 1 INJECTION, POWDER, FOR SOLUTION INTRAVENOUS at 21:23

## 2018-11-07 RX ADMIN — MORPHINE SULFATE 4 MG: 2 INJECTION, SOLUTION INTRAMUSCULAR; INTRAVENOUS at 14:10

## 2018-11-07 RX ADMIN — LEVETIRACETAM 500 MG: 100 INJECTION, SOLUTION INTRAVENOUS at 22:36

## 2018-11-07 RX ADMIN — CARVEDILOL 25 MG: 25 TABLET, FILM COATED ORAL at 09:35

## 2018-11-07 RX ADMIN — MEROPENEM 1 G: 1 INJECTION, POWDER, FOR SOLUTION INTRAVENOUS at 08:53

## 2018-11-07 ASSESSMENT — PAIN SCALES - GENERAL
PAINLEVEL_OUTOF10: 3
PAINLEVEL_OUTOF10: 8
PAINLEVEL_OUTOF10: 10
PAINLEVEL_OUTOF10: 0
PAINLEVEL_OUTOF10: 1

## 2018-11-07 ASSESSMENT — PAIN DESCRIPTION - PROGRESSION
CLINICAL_PROGRESSION: GRADUALLY WORSENING
CLINICAL_PROGRESSION: GRADUALLY WORSENING

## 2018-11-07 ASSESSMENT — PAIN DESCRIPTION - ORIENTATION
ORIENTATION: UPPER;MID
ORIENTATION: RIGHT

## 2018-11-07 ASSESSMENT — PAIN DESCRIPTION - DESCRIPTORS
DESCRIPTORS: ACHING
DESCRIPTORS: ACHING

## 2018-11-07 ASSESSMENT — PAIN DESCRIPTION - ONSET
ONSET: ON-GOING
ONSET: ON-GOING

## 2018-11-07 ASSESSMENT — PAIN DESCRIPTION - PAIN TYPE
TYPE: ACUTE PAIN
TYPE: CHRONIC PAIN

## 2018-11-07 ASSESSMENT — PAIN DESCRIPTION - LOCATION
LOCATION: FOOT
LOCATION: BACK

## 2018-11-07 ASSESSMENT — PAIN DESCRIPTION - FREQUENCY
FREQUENCY: INTERMITTENT
FREQUENCY: CONTINUOUS

## 2018-11-07 NOTE — PROGRESS NOTES
Physical Therapy  Facility/Department: [facility]  Daily Treatment Note  NAME: Libby Mendoza  : 1950  MRN: [id number]    Date of Service: 2018    Discharge Recommendations: Libby Mendoza scored a 10/24 on the AM-PAC short mobility form. Current research shows that an AM-PAC score of 17 or less is typically not associated with a discharge to the patient's home setting. Based on the patients AM-PAC score and their current functional mobility deficits, it is recommended that the patient have 3-5 sessions per week of Physical Therapy at d/c to increase the patients independence. HOME HEALTH CARE: LEVEL 4 SICK     - Initial home health evaluation to occur within 24-48 hours, in patient home   - Therapy evaluations in home within 24-48 hours of discharge; including DME and home safety   - Frontload therapy 5 days, then 3x a week   - Therapy to evaluate if patient has [address] needs for personal care   -  evaluation within 24-48 hours, includes evaluation of resources and insurance to determine [place], [place], LTC, and Medicaid options      PT Equipment Recommendations  Equipment Needed: No    Patient Diagnosis(es): The primary encounter diagnosis was Seizure ([facility] [address] Diagnoses of Non-intractable vomiting with nausea, unspecified vomiting type, Nonintractable headache, unspecified chronicity pattern, unspecified headache type, and Pneumonia due to organism were also pertinent to this visit. has a past medical history of Adenomatous colon polyp; Adrenal nodule ([address] Allergic rhinitis; Anxiety; Back pain; Bacterial infection due to Staphylococcus aureus; Blood transfusion; CAD (coronary artery disease); Cancer of sigmoid colon ([address] Depressed; Diabetes mellitus type II; Dry skin; Fibromyalgia; GERD (gastroesophageal reflux disease); History of blood transfusion; HTN (hypertension); Hyperlipemia; Infection of prosthetic vascular graft ([address]  Insomnia; Lumbar disc disease; Lumbar

## 2018-11-07 NOTE — PROGRESS NOTES
Neurology Progress Note    Updates:  No major changes    Exam:  Blood pressure (!) 173/70, pulse 65, temperature 98.4 °F (36.9 °C), temperature source Oral, resp. rate 16, height 5' 5\" (1.651 m), weight 190 lb (86.2 kg), SpO2 98 %, not currently breastfeeding.  -Mental status: Alert. Oriented to person. Disoriented to place, month, year. Tells me she knows yesterday was election day  -Speech & Language: no aphasia; no dysarthria  -Cranial nerves: pupils symmetric; no notable dysconjugate gaze; eyes midline; no facial asymmetry  -Motor: moving all extremities symmetrically and fully  -Other: no adventitious movements noted  Other Systems  -General Appearance: well-developed, well-nourished, no apparent distress  -Neck: supple  -Lungs: breathing unlabored, regular, no audible wheezes  -CV: pulses strong x4 extremities  -Abd: flat    Labs:  Creatinine 1.0 mg/dL  Glucose 84 mg/dL  Tox screen negative  WBC 9.2K  Blood cultures x 2 NGTD  LFTs okay  NH3 17 umol/L    Studies:  MRI brain: pending    CT head: subacute infarct right occipital lobe. No ICH or mass effect. EKG: NSR    EEG:  - Mild generalized slowing  - FIRDA (Frontal intermittent rhythmic delta activity) which is a   non-specific finding that can be secondary to a variety of   etiologies including toxic, metabolic, post-hypoxic, and   degenerative processes as well as deep midline structural   abnormalities. Vitals:    11/07/18 0328 11/07/18 0415 11/07/18 0715 11/07/18 0845   BP: (!) 170/78 (!) 171/76 (!) 189/90 (!) 173/70   Pulse: 65  65    Resp: 16  16    Temp: 98.4 °F (36.9 °C)  98.4 °F (36.9 °C)    TempSrc: Oral  Oral    SpO2: 99%  98%    Weight:       Height:         Impression:  Kinsey López is a 77 y.o. female with recurrent seizures and encephalopathy in the setting of recent right PCA stroke, bilateral carotid stenosis, and hypertension. No further seizures since initially seen by neurology, though she remains encephalopathic.  Exam pretty

## 2018-11-07 NOTE — PROGRESS NOTES
Occupational Therapy   Occupational Therapy Initial Assessment and Treatment  Date: 2018   Patient Name: Nancie Briceno  MRN: [id number]     : 1950    Date of Service: 2018    Discharge Recommendations: Nancie Briceno scored a 13/24 on the AM-PAC ADL Inpatient form. Current research shows that an AM-PAC score of 17 or less is typically not associated with a discharge to the patient's home setting. Based on the patients AM-PAC score and their current ADL deficits, it is recommended that the patient have 3-5 sessions per week of Occupational Therapy at d/c to increase the patients independence. OT Equipment Recommendations  Equipment Needed: No  Other: defer      Patient Diagnosis(es): The primary encounter diagnosis was Seizure ([address] Diagnoses of Non-intractable vomiting with nausea, unspecified vomiting type, Nonintractable headache, unspecified chronicity pattern, unspecified headache type, and Pneumonia due to organism were also pertinent to this visit. has a past medical history of Adenomatous colon polyp; Adrenal nodule ([address] Allergic rhinitis; Anxiety; Back pain; Bacterial infection due to Staphylococcus aureus; Blood transfusion; CAD (coronary artery disease); Cancer of sigmoid colon ([address] Depressed; Diabetes mellitus type II; Dry skin; Fibromyalgia; GERD (gastroesophageal reflux disease); History of blood transfusion; HTN (hypertension); Hyperlipemia; Infection of prosthetic vascular graft ([address] Insomnia; Lumbar disc disease; Lumbar spinal stenosis; Memory loss; Numbness; Osteopenia; Peripheral vascular disease ([address] PONV (postoperative nausea and vomiting); Renal artery stenosis ([address] and S/P vascular surgery. has a past surgical history that includes Ankle fracture surgery (10/03); Aorto-femoral Bypass Graft (); Toe amputation (); IR Femoral Popliteal Bypass Graft ();  Tubal ligation; Bypass Graft (Left, 2011); other surgical history (Left, Performance deficits / Impairments: Decreased functional mobility ; Decreased ADL status; Decreased strength;Decreased safe awareness;Decreased balance;Decreased endurance    Assessment: Functional independence is decreased from baseline. Pt currently requiring min-mod assist x2 for transfers/mobility and dep for lower body ADLs. Per chart review, pt appears more alert than previous days. Pt is very weak while standing and presents as a very high fall risk. She is not safe to return home at this time and would benefit from continued inpt OT at d/c. Treatment Diagnosis: Decreased ADLs, transfers, and mobility    Decision Making: Medium Complexity  Patient Education: Role of OT, activity promotion; verb understanding, will likely need reinforcement  REQUIRES OT FOLLOW UP: Yes  Activity Tolerance  Activity Tolerance: Patient Tolerated treatment well;Patient limited by fatigue  Safety Devices  Safety Devices in place: Yes  Type of devices: Left in chair;Nurse notified;Call light within reach; Chair alarm in place (assist level on board- assist x2 w/ RW vs stedy, gait belt-- RN aware)         Plan   Plan  Times per week: 2-5x  Times per day: Daily  If patient discharges prior to next treatment, this note will serve as discharge summary. Will continue per POC if patient does not discharge. AM-PAC Score        AM-PAC Inpatient Daily Activity Raw Score: 13  AM-PAC Inpatient ADL T-Scale Score : 32.03  ADL Inpatient CMS 0-100% Score: 63.03  ADL Inpatient CMS G-Code Modifier : CL    Goals  Short term goals  Time Frame for Short term goals: By d/c   Short term goal 1: Standing balance for further ADLs/mobility x5 mins and CGA  Short term goal 2: BSC transfers w/ CGA   Short term goal 3: Chair push ups x10 reps w/ min cues to increase ind w/ transfers   Patient Goals   Patient goals :  To return home        Therapy Time   Individual Concurrent Group Co-treatment   Time In 0833         Time Out 0912         Minutes 39

## 2018-11-08 ENCOUNTER — APPOINTMENT (OUTPATIENT)
Dept: GENERAL RADIOLOGY | Age: 68
DRG: 056 | End: 2018-11-08
Payer: COMMERCIAL

## 2018-11-08 ENCOUNTER — APPOINTMENT (OUTPATIENT)
Dept: CT IMAGING | Age: 68
DRG: 056 | End: 2018-11-08
Payer: COMMERCIAL

## 2018-11-08 LAB
ANION GAP SERPL CALCULATED.3IONS-SCNC: 11 MMOL/L (ref 3–16)
BASOPHILS ABSOLUTE: 0.1 K/UL (ref 0–0.2)
BASOPHILS RELATIVE PERCENT: 1.1 %
BUN BLDV-MCNC: 18 MG/DL (ref 7–20)
CALCIUM SERPL-MCNC: 8.4 MG/DL (ref 8.3–10.6)
CHLORIDE BLD-SCNC: 111 MMOL/L (ref 99–110)
CO2: 21 MMOL/L (ref 21–32)
CREAT SERPL-MCNC: 0.9 MG/DL (ref 0.6–1.2)
EOSINOPHILS ABSOLUTE: 0.6 K/UL (ref 0–0.6)
EOSINOPHILS RELATIVE PERCENT: 5.9 %
GFR AFRICAN AMERICAN: >60
GFR NON-AFRICAN AMERICAN: >60
GLUCOSE BLD-MCNC: 128 MG/DL (ref 70–99)
GLUCOSE BLD-MCNC: 131 MG/DL (ref 70–99)
GLUCOSE BLD-MCNC: 195 MG/DL (ref 70–99)
GLUCOSE BLD-MCNC: 231 MG/DL (ref 70–99)
GLUCOSE BLD-MCNC: 243 MG/DL (ref 70–99)
HCT VFR BLD CALC: 28.6 % (ref 36–48)
HEMOGLOBIN: 9.9 G/DL (ref 12–16)
LYMPHOCYTES ABSOLUTE: 1 K/UL (ref 1–5.1)
LYMPHOCYTES RELATIVE PERCENT: 9.5 %
MCH RBC QN AUTO: 32.2 PG (ref 26–34)
MCHC RBC AUTO-ENTMCNC: 34.6 G/DL (ref 31–36)
MCV RBC AUTO: 93.2 FL (ref 80–100)
MONOCYTES ABSOLUTE: 0.9 K/UL (ref 0–1.3)
MONOCYTES RELATIVE PERCENT: 9 %
NEUTROPHILS ABSOLUTE: 7.7 K/UL (ref 1.7–7.7)
NEUTROPHILS RELATIVE PERCENT: 74.5 %
PDW BLD-RTO: 14.4 % (ref 12.4–15.4)
PERFORMED ON: ABNORMAL
PLATELET # BLD: 183 K/UL (ref 135–450)
PMV BLD AUTO: 9.3 FL (ref 5–10.5)
POTASSIUM REFLEX MAGNESIUM: 5.2 MMOL/L (ref 3.5–5.1)
RBC # BLD: 3.07 M/UL (ref 4–5.2)
SODIUM BLD-SCNC: 143 MMOL/L (ref 136–145)
WBC # BLD: 10.3 K/UL (ref 4–11)

## 2018-11-08 PROCEDURE — 1200000000 HC SEMI PRIVATE

## 2018-11-08 PROCEDURE — 70450 CT HEAD/BRAIN W/O DYE: CPT

## 2018-11-08 PROCEDURE — 6370000000 HC RX 637 (ALT 250 FOR IP): Performed by: STUDENT IN AN ORGANIZED HEALTH CARE EDUCATION/TRAINING PROGRAM

## 2018-11-08 PROCEDURE — 6360000002 HC RX W HCPCS: Performed by: EMERGENCY MEDICINE

## 2018-11-08 PROCEDURE — 73620 X-RAY EXAM OF FOOT: CPT

## 2018-11-08 PROCEDURE — 6370000000 HC RX 637 (ALT 250 FOR IP): Performed by: INTERNAL MEDICINE

## 2018-11-08 PROCEDURE — 2580000003 HC RX 258: Performed by: EMERGENCY MEDICINE

## 2018-11-08 PROCEDURE — 85025 COMPLETE CBC W/AUTO DIFF WBC: CPT

## 2018-11-08 PROCEDURE — 36415 COLL VENOUS BLD VENIPUNCTURE: CPT

## 2018-11-08 PROCEDURE — 6360000002 HC RX W HCPCS: Performed by: STUDENT IN AN ORGANIZED HEALTH CARE EDUCATION/TRAINING PROGRAM

## 2018-11-08 PROCEDURE — 2580000003 HC RX 258: Performed by: STUDENT IN AN ORGANIZED HEALTH CARE EDUCATION/TRAINING PROGRAM

## 2018-11-08 PROCEDURE — 6360000002 HC RX W HCPCS: Performed by: INTERNAL MEDICINE

## 2018-11-08 PROCEDURE — 80048 BASIC METABOLIC PNL TOTAL CA: CPT

## 2018-11-08 RX ORDER — FUROSEMIDE 10 MG/ML
20 INJECTION INTRAMUSCULAR; INTRAVENOUS ONCE
Status: COMPLETED | OUTPATIENT
Start: 2018-11-08 | End: 2018-11-08

## 2018-11-08 RX ADMIN — ENOXAPARIN SODIUM 40 MG: 40 INJECTION SUBCUTANEOUS at 16:58

## 2018-11-08 RX ADMIN — LEVETIRACETAM 500 MG: 100 INJECTION, SOLUTION INTRAVENOUS at 08:03

## 2018-11-08 RX ADMIN — PANTOPRAZOLE SODIUM 40 MG: 40 TABLET, DELAYED RELEASE ORAL at 06:25

## 2018-11-08 RX ADMIN — ALPRAZOLAM 0.25 MG: 0.25 TABLET ORAL at 09:01

## 2018-11-08 RX ADMIN — LISINOPRIL 40 MG: 40 TABLET ORAL at 08:03

## 2018-11-08 RX ADMIN — MEROPENEM 1 G: 1 INJECTION, POWDER, FOR SOLUTION INTRAVENOUS at 14:13

## 2018-11-08 RX ADMIN — CYANOCOBALAMIN 1000 MCG: 1000 INJECTION, SOLUTION INTRAMUSCULAR; SUBCUTANEOUS at 09:08

## 2018-11-08 RX ADMIN — ALPRAZOLAM 0.25 MG: 0.25 TABLET ORAL at 20:01

## 2018-11-08 RX ADMIN — LEVETIRACETAM 500 MG: 100 INJECTION, SOLUTION INTRAVENOUS at 20:01

## 2018-11-08 RX ADMIN — AMLODIPINE BESYLATE 5 MG: 5 TABLET ORAL at 20:01

## 2018-11-08 RX ADMIN — ASPIRIN 325 MG: 325 TABLET, DELAYED RELEASE ORAL at 09:00

## 2018-11-08 RX ADMIN — ESCITALOPRAM OXALATE 10 MG: 10 TABLET ORAL at 09:01

## 2018-11-08 RX ADMIN — ATORVASTATIN CALCIUM 40 MG: 40 TABLET, FILM COATED ORAL at 20:01

## 2018-11-08 RX ADMIN — POTASSIUM CHLORIDE 40 MEQ: 20 TABLET, EXTENDED RELEASE ORAL at 09:01

## 2018-11-08 RX ADMIN — ACETAMINOPHEN 650 MG: 325 TABLET, FILM COATED ORAL at 09:08

## 2018-11-08 RX ADMIN — ACETAMINOPHEN 650 MG: 325 TABLET, FILM COATED ORAL at 16:59

## 2018-11-08 RX ADMIN — CLOPIDOGREL BISULFATE 75 MG: 75 TABLET ORAL at 09:01

## 2018-11-08 RX ADMIN — MEROPENEM 1 G: 1 INJECTION, POWDER, FOR SOLUTION INTRAVENOUS at 22:31

## 2018-11-08 RX ADMIN — INSULIN LISPRO 2 UNITS: 100 INJECTION, SOLUTION INTRAVENOUS; SUBCUTANEOUS at 11:58

## 2018-11-08 RX ADMIN — CARVEDILOL 25 MG: 25 TABLET, FILM COATED ORAL at 08:03

## 2018-11-08 RX ADMIN — FUROSEMIDE 20 MG: 10 INJECTION, SOLUTION INTRAMUSCULAR; INTRAVENOUS at 23:43

## 2018-11-08 RX ADMIN — ACETAMINOPHEN 650 MG: 325 TABLET, FILM COATED ORAL at 22:25

## 2018-11-08 RX ADMIN — CARVEDILOL 25 MG: 25 TABLET, FILM COATED ORAL at 16:56

## 2018-11-08 RX ADMIN — MEROPENEM 1 G: 1 INJECTION, POWDER, FOR SOLUTION INTRAVENOUS at 06:24

## 2018-11-08 RX ADMIN — INSULIN LISPRO 4 UNITS: 100 INJECTION, SOLUTION INTRAVENOUS; SUBCUTANEOUS at 17:00

## 2018-11-08 RX ADMIN — INSULIN LISPRO 2 UNITS: 100 INJECTION, SOLUTION INTRAVENOUS; SUBCUTANEOUS at 20:09

## 2018-11-08 RX ADMIN — ALPRAZOLAM 0.25 MG: 0.25 TABLET ORAL at 14:13

## 2018-11-08 ASSESSMENT — PAIN DESCRIPTION - PAIN TYPE
TYPE: ACUTE PAIN
TYPE: ACUTE PAIN
TYPE: CHRONIC PAIN

## 2018-11-08 ASSESSMENT — PAIN SCALES - GENERAL
PAINLEVEL_OUTOF10: 1
PAINLEVEL_OUTOF10: 3
PAINLEVEL_OUTOF10: 3
PAINLEVEL_OUTOF10: 0
PAINLEVEL_OUTOF10: 3
PAINLEVEL_OUTOF10: 3

## 2018-11-08 ASSESSMENT — PAIN DESCRIPTION - PROGRESSION
CLINICAL_PROGRESSION: GRADUALLY WORSENING

## 2018-11-08 ASSESSMENT — PAIN DESCRIPTION - LOCATION
LOCATION: BACK
LOCATION: FOOT
LOCATION: BACK

## 2018-11-08 ASSESSMENT — PAIN DESCRIPTION - ORIENTATION
ORIENTATION: LOWER
ORIENTATION: LOWER
ORIENTATION: RIGHT

## 2018-11-08 ASSESSMENT — PAIN DESCRIPTION - FREQUENCY
FREQUENCY: CONTINUOUS

## 2018-11-08 ASSESSMENT — PAIN DESCRIPTION - DESCRIPTORS
DESCRIPTORS: ACHING

## 2018-11-08 ASSESSMENT — PAIN DESCRIPTION - ONSET
ONSET: ON-GOING

## 2018-11-08 NOTE — FLOWSHEET NOTE
11/08/18 1100   Encounter Summary   Volunteer Visit (Patient received communion from Ana M Mueller )   [address] Patient received communion

## 2018-11-08 NOTE — PROGRESS NOTES
Clear to auscultation, bilaterally without Rales/Wheezes/Rhonchi. Cardiovascular: Regular rate and rhythm with normal S1/S2 without murmurs, rubs or gallops. Abdomen: Soft, non-tender, non-distended with normal bowel sounds. Musculoskeletal: No clubbing, cyanosis or edema bilaterally. Full range of motion without deformity. Skin: Skin color, texture, turgor normal.  No rashes or lesions. Neurologic: Alert and Oriented to person, place and president. Neurovascularly intact without any focal sensory/motor deficits. Cranial nerves: II-XII intact, grossly non-focal.  Peripheral Pulses: +2 palpable, equal bilaterally       Labs:   Recent Labs      11/06/18   0850  11/07/18   0553  11/08/18   0614   WBC  12.8*  9.2  10.3   HGB  10.3*  9.3*  9.9*   HCT  29.4*  26.3*  28.6*   PLT  175  165  183     Recent Labs      11/06/18   0850  11/07/18   0553  11/08/18   0614   NA  142  143  143   K  3.3*  3.7  5.2*   CL  105  110  111*   CO2  25  25  21   BUN  15  16  18   CREATININE  0.9  1.0  0.9   CALCIUM  8.3  8.0*  8.4     No results for input(s): AST, ALT, BILIDIR, BILITOT, ALKPHOS in the last 72 hours. No results for input(s): INR in the last 72 hours. No results for input(s): Rumalda Revel in the last 72 hours. Urinalysis:    Lab Results   Component Value Date    NITRU Negative 11/03/2018    WBCUA 3-5 11/03/2018    BACTERIA 2+ 11/03/2018    RBCUA 3-5 11/03/2018    BLOODU SMALL 11/03/2018    SPECGRAV 1.025 11/03/2018    GLUCOSEU 250 11/03/2018    GLUCOSEU Negative 11/30/2011       Radiology:  XR CHEST PORTABLE   Final Result      1. Mild new consolidation of the right mid and lower lung zones. 2. Sclerotic lesion of the right humeral head progressed dating back to chest x-ray from 9/10/2010, favored to be benign. XR CHEST PORTABLE   Final Result      Right lower lobe atelectasis and patchy pneumonia.       CT ABDOMEN PELVIS W IV CONTRAST Additional Contrast? None   Final Result      No change in 3 cm

## 2018-11-08 NOTE — PLAN OF CARE
Problem: Falls - Risk of:  Goal: Will remain free from falls  Will remain free from falls   Outcome: Ongoing  Patient has remained free of falls. 2/4 bed rails up, bed locked and in lowest position, call light within reach. Patient instructed on use of call light and uses appropriately. Bed alarm on. Non-skid footwear and fall band on. Will continue to monitor. Problem: Risk for Impaired Skin Integrity  Goal: Tissue integrity - skin and mucous membranes  Structural intactness and normal physiological function of skin and  mucous membranes. Outcome: Ongoing  Patient turned and repositioned every two hours. Barrier cream used on bottom. Sacral heart on buttocks. Heels elevated off of bed. Specialty mattress in use. Skin thoroughly assessed. Will continue to monitor.

## 2018-11-08 NOTE — PROGRESS NOTES
neurology in 3 months    A copy of this note was provided for Dr Ling Gomez, MD Ricky Moreno, NP  Neurology  [phone]  Evenings, weekends, and off weeks please discuss with the covering neurologist.

## 2018-11-08 NOTE — CARE COORDINATION
Resident updated CM pt may be ready to discharge in the next day or two. Call placed to pt's daughter and Bret Champion, to discuss plan for discharge since pt had been living at daughter's home. Dora very angry stating, \"What have they even done? She ain't ready. They don't know why she had the seizure. \" Explained CM assists with next steps in level of care. Daughter wanted pt to go to [facility] but then stated they had denied her medigold in the past. She has an appointment meet with [employer] to help with Medicaid. Will provide the number for Paty Barrera at [facility] to see if he can help expedite Medicaid enrollment. Will also provide daughter with a list of [place] [id number] approved facilities.

## 2018-11-08 NOTE — PROGRESS NOTES
Attempted to have MRI done x 2. Morphine given x 2 and Haldol. Pt was unable to lay flat due to chronic back pain. Perfect served Dr. Simon Hernnadez to let him know that MRI was unsuccessful. Pt currently back in bed resting. Will continue to monitor and follow POC.

## 2018-11-09 ENCOUNTER — APPOINTMENT (OUTPATIENT)
Dept: CT IMAGING | Age: 68
DRG: 056 | End: 2018-11-09
Payer: COMMERCIAL

## 2018-11-09 LAB
ANION GAP SERPL CALCULATED.3IONS-SCNC: 10 MMOL/L (ref 3–16)
BASOPHILS ABSOLUTE: 0 K/UL (ref 0–0.2)
BASOPHILS RELATIVE PERCENT: 0 %
BUN BLDV-MCNC: 17 MG/DL (ref 7–20)
CALCIUM SERPL-MCNC: 8.6 MG/DL (ref 8.3–10.6)
CHLORIDE BLD-SCNC: 104 MMOL/L (ref 99–110)
CO2: 25 MMOL/L (ref 21–32)
CREAT SERPL-MCNC: 0.8 MG/DL (ref 0.6–1.2)
EOSINOPHILS ABSOLUTE: 0.2 K/UL (ref 0–0.6)
EOSINOPHILS RELATIVE PERCENT: 2 %
GFR AFRICAN AMERICAN: >60
GFR NON-AFRICAN AMERICAN: >60
GLUCOSE BLD-MCNC: 120 MG/DL (ref 70–99)
GLUCOSE BLD-MCNC: 145 MG/DL (ref 70–99)
GLUCOSE BLD-MCNC: 155 MG/DL (ref 70–99)
GLUCOSE BLD-MCNC: 177 MG/DL (ref 70–99)
GLUCOSE BLD-MCNC: 189 MG/DL (ref 70–99)
HCT VFR BLD CALC: 27.5 % (ref 36–48)
HEMOGLOBIN: 9.4 G/DL (ref 12–16)
INR BLD: 1.29 (ref 0.86–1.14)
LYMPHOCYTES ABSOLUTE: 1.5 K/UL (ref 1–5.1)
LYMPHOCYTES RELATIVE PERCENT: 13 %
MCH RBC QN AUTO: 32.3 PG (ref 26–34)
MCHC RBC AUTO-ENTMCNC: 34.4 G/DL (ref 31–36)
MCV RBC AUTO: 94 FL (ref 80–100)
MONOCYTES ABSOLUTE: 0.9 K/UL (ref 0–1.3)
MONOCYTES RELATIVE PERCENT: 8 %
NEUTROPHILS ABSOLUTE: 8.9 K/UL (ref 1.7–7.7)
NEUTROPHILS RELATIVE PERCENT: 77 %
PDW BLD-RTO: 14 % (ref 12.4–15.4)
PERFORMED ON: ABNORMAL
PLATELET # BLD: 227 K/UL (ref 135–450)
PMV BLD AUTO: 9.1 FL (ref 5–10.5)
POTASSIUM REFLEX MAGNESIUM: 4.4 MMOL/L (ref 3.5–5.1)
PROTHROMBIN TIME: 14.7 SEC (ref 9.8–13)
RBC # BLD: 2.92 M/UL (ref 4–5.2)
SEDIMENTATION RATE, ERYTHROCYTE: >120 MM/HR (ref 0–30)
SODIUM BLD-SCNC: 139 MMOL/L (ref 136–145)
VITAMIN B1 WHOLE BLOOD: 87 NMOL/L (ref 70–180)
WBC # BLD: 11.5 K/UL (ref 4–11)

## 2018-11-09 PROCEDURE — 85025 COMPLETE CBC W/AUTO DIFF WBC: CPT

## 2018-11-09 PROCEDURE — 6360000002 HC RX W HCPCS: Performed by: EMERGENCY MEDICINE

## 2018-11-09 PROCEDURE — 6370000000 HC RX 637 (ALT 250 FOR IP): Performed by: INTERNAL MEDICINE

## 2018-11-09 PROCEDURE — 2580000003 HC RX 258: Performed by: EMERGENCY MEDICINE

## 2018-11-09 PROCEDURE — 6360000002 HC RX W HCPCS: Performed by: INTERNAL MEDICINE

## 2018-11-09 PROCEDURE — 85610 PROTHROMBIN TIME: CPT

## 2018-11-09 PROCEDURE — 6360000002 HC RX W HCPCS: Performed by: STUDENT IN AN ORGANIZED HEALTH CARE EDUCATION/TRAINING PROGRAM

## 2018-11-09 PROCEDURE — 6370000000 HC RX 637 (ALT 250 FOR IP): Performed by: STUDENT IN AN ORGANIZED HEALTH CARE EDUCATION/TRAINING PROGRAM

## 2018-11-09 PROCEDURE — 36415 COLL VENOUS BLD VENIPUNCTURE: CPT

## 2018-11-09 PROCEDURE — 2580000003 HC RX 258: Performed by: STUDENT IN AN ORGANIZED HEALTH CARE EDUCATION/TRAINING PROGRAM

## 2018-11-09 PROCEDURE — 94760 N-INVAS EAR/PLS OXIMETRY 1: CPT

## 2018-11-09 PROCEDURE — 80048 BASIC METABOLIC PNL TOTAL CA: CPT

## 2018-11-09 PROCEDURE — 2580000003 HC RX 258: Performed by: INTERNAL MEDICINE

## 2018-11-09 PROCEDURE — 99223 1ST HOSP IP/OBS HIGH 75: CPT | Performed by: INTERNAL MEDICINE

## 2018-11-09 PROCEDURE — 6360000004 HC RX CONTRAST MEDICATION: Performed by: STUDENT IN AN ORGANIZED HEALTH CARE EDUCATION/TRAINING PROGRAM

## 2018-11-09 PROCEDURE — 1200000000 HC SEMI PRIVATE

## 2018-11-09 PROCEDURE — 85652 RBC SED RATE AUTOMATED: CPT

## 2018-11-09 PROCEDURE — 84244 ASSAY OF RENIN: CPT

## 2018-11-09 PROCEDURE — 71275 CT ANGIOGRAPHY CHEST: CPT

## 2018-11-09 PROCEDURE — 82088 ASSAY OF ALDOSTERONE: CPT

## 2018-11-09 PROCEDURE — 2700000000 HC OXYGEN THERAPY PER DAY

## 2018-11-09 RX ORDER — SODIUM CHLORIDE 9 MG/ML
1000 INJECTION, SOLUTION INTRAVENOUS CONTINUOUS
Status: ACTIVE | OUTPATIENT
Start: 2018-11-09 | End: 2018-11-09

## 2018-11-09 RX ORDER — ASPIRIN 325 MG
324 TABLET ORAL DAILY
Status: DISCONTINUED | OUTPATIENT
Start: 2018-11-09 | End: 2018-11-09

## 2018-11-09 RX ORDER — OXYMETAZOLINE HYDROCHLORIDE 0.05 G/100ML
2 SPRAY NASAL 2 TIMES DAILY
Status: DISCONTINUED | OUTPATIENT
Start: 2018-11-09 | End: 2018-11-11

## 2018-11-09 RX ORDER — HYDRALAZINE HYDROCHLORIDE 50 MG/1
50 TABLET, FILM COATED ORAL EVERY 8 HOURS SCHEDULED
Status: DISCONTINUED | OUTPATIENT
Start: 2018-11-09 | End: 2018-11-12

## 2018-11-09 RX ORDER — CLOPIDOGREL BISULFATE 75 MG/1
75 TABLET ORAL DAILY
Status: DISCONTINUED | OUTPATIENT
Start: 2018-11-10 | End: 2018-11-09

## 2018-11-09 RX ORDER — HYDRALAZINE HYDROCHLORIDE 50 MG/1
50 TABLET, FILM COATED ORAL EVERY 8 HOURS SCHEDULED
Status: DISCONTINUED | OUTPATIENT
Start: 2018-11-09 | End: 2018-11-09

## 2018-11-09 RX ADMIN — ALPRAZOLAM 0.25 MG: 0.25 TABLET ORAL at 21:02

## 2018-11-09 RX ADMIN — AMLODIPINE BESYLATE 5 MG: 5 TABLET ORAL at 21:02

## 2018-11-09 RX ADMIN — ATORVASTATIN CALCIUM 40 MG: 40 TABLET, FILM COATED ORAL at 21:02

## 2018-11-09 RX ADMIN — ENOXAPARIN SODIUM 40 MG: 40 INJECTION SUBCUTANEOUS at 08:40

## 2018-11-09 RX ADMIN — CLOPIDOGREL BISULFATE 75 MG: 75 TABLET ORAL at 08:44

## 2018-11-09 RX ADMIN — MEROPENEM 1 G: 1 INJECTION, POWDER, FOR SOLUTION INTRAVENOUS at 06:00

## 2018-11-09 RX ADMIN — SODIUM CHLORIDE 1000 ML: 0.9 INJECTION, SOLUTION INTRAVENOUS at 11:30

## 2018-11-09 RX ADMIN — ESCITALOPRAM OXALATE 10 MG: 10 TABLET ORAL at 08:43

## 2018-11-09 RX ADMIN — OXYMETAZOLINE HYDROCHLORIDE 2 SPRAY: 5 SPRAY NASAL at 21:41

## 2018-11-09 RX ADMIN — ASPIRIN 325 MG: 325 TABLET, DELAYED RELEASE ORAL at 08:41

## 2018-11-09 RX ADMIN — INSULIN LISPRO 1 UNITS: 100 INJECTION, SOLUTION INTRAVENOUS; SUBCUTANEOUS at 21:40

## 2018-11-09 RX ADMIN — CARVEDILOL 25 MG: 25 TABLET, FILM COATED ORAL at 08:46

## 2018-11-09 RX ADMIN — CARVEDILOL 25 MG: 25 TABLET, FILM COATED ORAL at 17:16

## 2018-11-09 RX ADMIN — LEVETIRACETAM 500 MG: 100 INJECTION, SOLUTION INTRAVENOUS at 09:47

## 2018-11-09 RX ADMIN — IOPAMIDOL 80 ML: 755 INJECTION, SOLUTION INTRAVENOUS at 12:16

## 2018-11-09 RX ADMIN — HYDRALAZINE HYDROCHLORIDE 50 MG: 50 TABLET, FILM COATED ORAL at 09:49

## 2018-11-09 RX ADMIN — ALPRAZOLAM 0.25 MG: 0.25 TABLET ORAL at 14:35

## 2018-11-09 RX ADMIN — HYDRALAZINE HYDROCHLORIDE 50 MG: 50 TABLET, FILM COATED ORAL at 21:02

## 2018-11-09 RX ADMIN — ALPRAZOLAM 0.25 MG: 0.25 TABLET ORAL at 08:45

## 2018-11-09 RX ADMIN — MEROPENEM 1 G: 1 INJECTION, POWDER, FOR SOLUTION INTRAVENOUS at 14:35

## 2018-11-09 RX ADMIN — LEVETIRACETAM 500 MG: 100 INJECTION, SOLUTION INTRAVENOUS at 21:02

## 2018-11-09 RX ADMIN — CYANOCOBALAMIN 1000 MCG: 1000 INJECTION, SOLUTION INTRAMUSCULAR; SUBCUTANEOUS at 12:38

## 2018-11-09 RX ADMIN — MEROPENEM 1 G: 1 INJECTION, POWDER, FOR SOLUTION INTRAVENOUS at 22:36

## 2018-11-09 RX ADMIN — ACETAMINOPHEN 650 MG: 325 TABLET, FILM COATED ORAL at 17:16

## 2018-11-09 RX ADMIN — LISINOPRIL 40 MG: 40 TABLET ORAL at 08:45

## 2018-11-09 ASSESSMENT — ENCOUNTER SYMPTOMS
ABDOMINAL DISTENTION: 0
BACK PAIN: 0
TROUBLE SWALLOWING: 0
SORE THROAT: 0
ABDOMINAL PAIN: 0
NAUSEA: 0
VOMITING: 0
CONSTIPATION: 0
SHORTNESS OF BREATH: 0
COUGH: 1
WHEEZING: 0
DIARRHEA: 0
CHEST TIGHTNESS: 0
APNEA: 0

## 2018-11-09 NOTE — PROGRESS NOTES
XR CHEST PORTABLE   Final Result      Right lower lobe atelectasis and patchy pneumonia. CT ABDOMEN PELVIS W IV CONTRAST Additional Contrast? None   Final Result      No change in 3 cm x 4.2 cm right adrenal mass. Extensive arterial wall calcification. No change in small aneurysm left common iliac artery. No abnormal bowel dilatation or wall thickening. No acute abnormality or abnormal fluid collection. CT Head WO Contrast   Final Result      1. Subacute infarct in the right occipital lobe. 2.  No acute intracranial hemorrhage or mass effect. MRI BRAIN W WO CONTRAST    (Results Pending)     Assessment/Plan   # Resistant HTN - Remains elevated  - cont. Amlodipine, coreg, lisinopril  - cont.  Hydralazine  - f/u renin/aldosterone labs  - Monitor, slow BP decrease    # Anemia - Hgb downtrending  - Monitor  - Replace    # Acid- base/ Electrolytes - VBG WNL, lactate downtrended  - K improved s/p lasix 20mg x1    #Asp PNA   - abx     #Recent CVA c/b Sz  - AED's     # CKDSt3  - Cr stable    Yu Ashley MD  PGY-1  [phone]    W/D/W/A    Kris Dancer, MD    [facility] [address] [place] [address] [phone]  [phone]    BP much better   Cont Hydralazine   Hold ACEI for 24 hour once CTA is done and renal function is stable we can restart it   Monitor BP   Saline 50 cc per hour for 10 hours for MAURICE prevention     Kris Dancer, MD

## 2018-11-09 NOTE — CONSULTS
kg/m²   Constitutional:  OAA X3 NAD  Skin: no rash, turgor wnl  Heent:  eomi, mmm  Neck: no bruits or jvd noted  Cardiovascular:  S1, S2 without m/r/g  Respiratory: CTA B without w/r/r  Abdomen:  +bs, soft, nt, nd  Ext: 1+ lower extremity edema  Psychiatric: mood and affect flat   Musculoskeletal:  Rom, muscular strength intact    Data:   Labs:  CBC:   Recent Labs      11/08/18 0614 11/09/18   0532  11/10/18   0546   WBC  10.3  11.5*  13.3*   HGB  9.9*  9.4*  9.2*   PLT  183  227  227     BMP:  Recent Labs      11/08/18 0614 11/09/18   0531  11/10/18   0546   NA  143  139  136   K  5.2*  4.4  4.5   CL  111*  104  102   CO2  21  25  21   BUN  18  17  17   CREATININE  0.9  0.8  0.8   GLUCOSE  131*  145*  145*     Ca/Mg/Phos: Recent Labs      11/08/18 0614 11/09/18   0531  11/10/18   0546   CALCIUM  8.4  8.6  8.6     Hepatic: No results for input(s): AST, ALT, ALB, BILITOT, ALKPHOS in the last 72 hours. Troponin: No results for input(s): TROPONINI in the last 72 hours. BNP: No results for input(s): BNP in the last 72 hours. Lipids: No results for input(s): CHOL, TRIG, HDL, LDLCALC, LABVLDL in the last 72 hours. ABGs: No results for input(s): PHART, PO2ART, CWQ2GZI in the last 72 hours. INR:   Recent Labs      11/09/18   1223   INR  1.29*     UA:No results for input(s): Marveen Heckler, GLUCOSEU, BILIRUBINUR, Carmelita Marchi, BLOODU, PHUR, PROTEINU, UROBILINOGEN, NITRU, LEUKOCYTESUR, Harlin Kana in the last 72 hours. Urine Microscopic: No results for input(s): LABCAST, BACTERIA, COMU, HYALCAST, WBCUA, RBCUA, EPIU in the last 72 hours. Urine Culture: No results for input(s): LABURIN in the last 72 hours. Urine Chemistry: No results for input(s): Cinda Alton, PROTEINUR, NAUR in the last 72 hours. IMAGING:  CTA NECK W CONTRAST   Final Result   1. Proximal soft and calcific plaque in left common carotid artery, borderline significant stenosis, 50% diameter.  CT in this region is limited

## 2018-11-09 NOTE — PROGRESS NOTES
Nutrition Assessment    Type and Reason for Visit: Initial    Nutrition Recommendations:   · Recommend liberalizing diet given po intake <50% of meals since admit, Dental soft, Low Potassium diet, monitor and record all po intake  · Continue Ensure Enlives, monitor and record intake   · Monitor weights, POCG and insulin regimen, and need for bowel regimen    Nutrition Assessment: Pt is being assessed for LOS diego. Pt is nutritionally at risk w/ intakes of 1-50% of meals throughout admit, per I/O. On a Dental soft diet, per speech recommendations, and pt w/ waxing/waning mental status, per chart review. CBW stated at 190lb, please obtain current, actual weight to assess for weight loss. POCG of 128-243, monitor insulin regimen and need for CC4 restrictions. Will liberalize from cardiac diet given poor po intake at this time. Malnutrition Assessment:  · Malnutrition Status: At risk for malnutrition  · Context: Chronic illness  · Findings of the 6 clinical characteristics of malnutrition (Minimum of 2 out of 6 clinical characteristics is required to make the diagnosis of moderate or severe Protein Calorie Malnutrition based on AND/ASPEN Guidelines):  1. Energy Intake-Less than 50% of estimated energy requirement for greater than or equal to 5 days,      2. Weight Loss-Unable to assess,    3. Fat Loss-Unable to assess (pt not available at this time),    4. Muscle Loss-Unable to assess,    5. Fluid Accumulation-No significant fluid accumulation,    6.  Strength-Not measured    Nutrition Risk Level:  Moderate    Nutrient Needs:  · Estimated Daily Total Kcal: 7510-8516  · Estimated Daily Protein (g): 68-80  · Estimated Daily Total Fluid (ml/day): 8551-6285    Nutrition Diagnosis:   · Problem: Inadequate oral intake  · Etiology: related to Insufficient energy/nutrient consumption     Signs and symptoms:  as evidenced by Intake 0-25%, Intake 25-50%    Objective Information:  · Nutrition-Focused Physical Findings:

## 2018-11-09 NOTE — PROGRESS NOTES
normal bowel sounds. Musculoskeletal: No clubbing, cyanosis or edema bilaterally. Full range of motion without deformity. Neurologic:  Alert and oriented to person and place, Neurovascularly intact without any focal sensory/motor deficits. Cranial nerves: II-XII intact, grossly non-focal.  Peripheral Pulses: +2 palpable, equal bilaterally       Labs:   Recent Labs      11/07/18   0553  11/08/18   0614  11/09/18   0532   WBC  9.2  10.3  11.5*   HGB  9.3*  9.9*  9.4*   HCT  26.3*  28.6*  27.5*   PLT  165  183  227     Recent Labs      11/07/18   0553  11/08/18   0614  11/09/18   0531   NA  143  143  139   K  3.7  5.2*  4.4   CL  110  111*  104   CO2  25  21  25   BUN  16  18  17   CREATININE  1.0  0.9  0.8   CALCIUM  8.0*  8.4  8.6     No results for input(s): AST, ALT, BILIDIR, BILITOT, ALKPHOS in the last 72 hours. No results for input(s): INR in the last 72 hours. No results for input(s): Jessica Lieu in the last 72 hours. Urinalysis:      Lab Results   Component Value Date    NITRU Negative 11/03/2018    WBCUA 3-5 11/03/2018    BACTERIA 2+ 11/03/2018    RBCUA 3-5 11/03/2018    BLOODU SMALL 11/03/2018    SPECGRAV 1.025 11/03/2018    GLUCOSEU 250 11/03/2018    GLUCOSEU Negative 11/30/2011       Radiology:  CT HEAD WO CONTRAST   Final Result      No acute or interval change. XR FOOT RIGHT (2 VIEWS)   Final Result      1. No acute osseous abnormality. 2. Mild first MTP arthrosis and mild hindfoot arthrosis. 3. Plantar calcaneal enthesophyte. XR CHEST PORTABLE   Final Result      1. Mild new consolidation of the right mid and lower lung zones. 2. Sclerotic lesion of the right humeral head progressed dating back to chest x-ray from 9/10/2010, favored to be benign. XR CHEST PORTABLE   Final Result      Right lower lobe atelectasis and patchy pneumonia. CT ABDOMEN PELVIS W IV CONTRAST Additional Contrast? None   Final Result      No change in 3 cm x 4.2 cm right adrenal mass.

## 2018-11-09 NOTE — PROGRESS NOTES
VSS w/ exception to elevated BP but not high enough to medicate. Pt is alert and oriented to person only. Pt screams out in the [address] often for her mother or daughter. Pt reoriented but does not remember a few minuets later. Pt has pain in her right foot and back. Tylenol given. Lungs diminished, BS active. Pt reports decreased appetite and denies N/V. Voiding per incontinent brief. Bed alarm on, wheels locked, bed in lowest position, side rails up 2/4, nonskid socks on, call light and bedside table in reach. Will continue to monitor.

## 2018-11-10 ENCOUNTER — APPOINTMENT (OUTPATIENT)
Dept: CT IMAGING | Age: 68
DRG: 056 | End: 2018-11-10
Payer: COMMERCIAL

## 2018-11-10 LAB
ANION GAP SERPL CALCULATED.3IONS-SCNC: 13 MMOL/L (ref 3–16)
BASOPHILS ABSOLUTE: 0.1 K/UL (ref 0–0.2)
BASOPHILS RELATIVE PERCENT: 0.6 %
BLOOD CULTURE, ROUTINE: NORMAL
BUN BLDV-MCNC: 17 MG/DL (ref 7–20)
C-REACTIVE PROTEIN: 134.6 MG/L (ref 0–5.1)
CALCIUM SERPL-MCNC: 8.6 MG/DL (ref 8.3–10.6)
CHLORIDE BLD-SCNC: 102 MMOL/L (ref 99–110)
CO2: 21 MMOL/L (ref 21–32)
CREAT SERPL-MCNC: 0.8 MG/DL (ref 0.6–1.2)
CULTURE, BLOOD 2: NORMAL
EOSINOPHILS ABSOLUTE: 0.3 K/UL (ref 0–0.6)
EOSINOPHILS RELATIVE PERCENT: 2.1 %
GFR AFRICAN AMERICAN: >60
GFR NON-AFRICAN AMERICAN: >60
GLUCOSE BLD-MCNC: 145 MG/DL (ref 70–99)
GLUCOSE BLD-MCNC: 155 MG/DL (ref 70–99)
GLUCOSE BLD-MCNC: 201 MG/DL (ref 70–99)
GLUCOSE BLD-MCNC: 214 MG/DL (ref 70–99)
GLUCOSE BLD-MCNC: 254 MG/DL (ref 70–99)
HCT VFR BLD CALC: 27 % (ref 36–48)
HEMOGLOBIN: 9.2 G/DL (ref 12–16)
LYMPHOCYTES ABSOLUTE: 1.4 K/UL (ref 1–5.1)
LYMPHOCYTES RELATIVE PERCENT: 10.3 %
MCH RBC QN AUTO: 32.5 PG (ref 26–34)
MCHC RBC AUTO-ENTMCNC: 34.2 G/DL (ref 31–36)
MCV RBC AUTO: 95 FL (ref 80–100)
MONOCYTES ABSOLUTE: 1.2 K/UL (ref 0–1.3)
MONOCYTES RELATIVE PERCENT: 8.9 %
NEUTROPHILS ABSOLUTE: 10.4 K/UL (ref 1.7–7.7)
NEUTROPHILS RELATIVE PERCENT: 78.1 %
PDW BLD-RTO: 14.5 % (ref 12.4–15.4)
PERFORMED ON: ABNORMAL
PLATELET # BLD: 227 K/UL (ref 135–450)
PMV BLD AUTO: 8.8 FL (ref 5–10.5)
POTASSIUM REFLEX MAGNESIUM: 4.5 MMOL/L (ref 3.5–5.1)
PROCALCITONIN: 0.28 NG/ML (ref 0–0.15)
RBC # BLD: 2.84 M/UL (ref 4–5.2)
SODIUM BLD-SCNC: 136 MMOL/L (ref 136–145)
WBC # BLD: 13.3 K/UL (ref 4–11)

## 2018-11-10 PROCEDURE — 6360000002 HC RX W HCPCS: Performed by: STUDENT IN AN ORGANIZED HEALTH CARE EDUCATION/TRAINING PROGRAM

## 2018-11-10 PROCEDURE — 86255 FLUORESCENT ANTIBODY SCREEN: CPT

## 2018-11-10 PROCEDURE — 70450 CT HEAD/BRAIN W/O DYE: CPT

## 2018-11-10 PROCEDURE — 6360000002 HC RX W HCPCS: Performed by: INTERNAL MEDICINE

## 2018-11-10 PROCEDURE — 6370000000 HC RX 637 (ALT 250 FOR IP): Performed by: INTERNAL MEDICINE

## 2018-11-10 PROCEDURE — 2580000003 HC RX 258: Performed by: EMERGENCY MEDICINE

## 2018-11-10 PROCEDURE — 6360000002 HC RX W HCPCS: Performed by: EMERGENCY MEDICINE

## 2018-11-10 PROCEDURE — 86140 C-REACTIVE PROTEIN: CPT

## 2018-11-10 PROCEDURE — 6370000000 HC RX 637 (ALT 250 FOR IP): Performed by: STUDENT IN AN ORGANIZED HEALTH CARE EDUCATION/TRAINING PROGRAM

## 2018-11-10 PROCEDURE — 2580000003 HC RX 258: Performed by: STUDENT IN AN ORGANIZED HEALTH CARE EDUCATION/TRAINING PROGRAM

## 2018-11-10 PROCEDURE — 84145 PROCALCITONIN (PCT): CPT

## 2018-11-10 PROCEDURE — 2700000000 HC OXYGEN THERAPY PER DAY

## 2018-11-10 PROCEDURE — 70496 CT ANGIOGRAPHY HEAD: CPT

## 2018-11-10 PROCEDURE — 94761 N-INVAS EAR/PLS OXIMETRY MLT: CPT

## 2018-11-10 PROCEDURE — 86038 ANTINUCLEAR ANTIBODIES: CPT

## 2018-11-10 PROCEDURE — 70498 CT ANGIOGRAPHY NECK: CPT

## 2018-11-10 PROCEDURE — 85025 COMPLETE CBC W/AUTO DIFF WBC: CPT

## 2018-11-10 PROCEDURE — 2580000003 HC RX 258: Performed by: INTERNAL MEDICINE

## 2018-11-10 PROCEDURE — 6360000004 HC RX CONTRAST MEDICATION: Performed by: INTERNAL MEDICINE

## 2018-11-10 PROCEDURE — 1200000000 HC SEMI PRIVATE

## 2018-11-10 PROCEDURE — 36415 COLL VENOUS BLD VENIPUNCTURE: CPT

## 2018-11-10 PROCEDURE — 80048 BASIC METABOLIC PNL TOTAL CA: CPT

## 2018-11-10 RX ORDER — PANTOPRAZOLE SODIUM 40 MG/1
40 TABLET, DELAYED RELEASE ORAL
Status: DISCONTINUED | OUTPATIENT
Start: 2018-11-10 | End: 2018-11-14 | Stop reason: HOSPADM

## 2018-11-10 RX ORDER — ASPIRIN 81 MG/1
81 TABLET ORAL DAILY
Status: DISCONTINUED | OUTPATIENT
Start: 2018-11-10 | End: 2018-11-14 | Stop reason: HOSPADM

## 2018-11-10 RX ORDER — METHYLPREDNISOLONE SODIUM SUCCINATE 125 MG/2ML
60 INJECTION, POWDER, LYOPHILIZED, FOR SOLUTION INTRAMUSCULAR; INTRAVENOUS DAILY
Status: DISCONTINUED | OUTPATIENT
Start: 2018-11-10 | End: 2018-11-12

## 2018-11-10 RX ORDER — ONDANSETRON 2 MG/ML
4 INJECTION INTRAMUSCULAR; INTRAVENOUS EVERY 6 HOURS PRN
Status: DISCONTINUED | OUTPATIENT
Start: 2018-11-10 | End: 2018-11-14 | Stop reason: HOSPADM

## 2018-11-10 RX ORDER — SODIUM CHLORIDE 9 MG/ML
INJECTION, SOLUTION INTRAVENOUS CONTINUOUS
Status: DISCONTINUED | OUTPATIENT
Start: 2018-11-10 | End: 2018-11-11

## 2018-11-10 RX ADMIN — HYDRALAZINE HYDROCHLORIDE 50 MG: 50 TABLET, FILM COATED ORAL at 05:57

## 2018-11-10 RX ADMIN — ALPRAZOLAM 0.25 MG: 0.25 TABLET ORAL at 20:44

## 2018-11-10 RX ADMIN — CYANOCOBALAMIN 1000 MCG: 1000 INJECTION, SOLUTION INTRAMUSCULAR; SUBCUTANEOUS at 09:21

## 2018-11-10 RX ADMIN — LEVETIRACETAM 500 MG: 100 INJECTION, SOLUTION INTRAVENOUS at 21:02

## 2018-11-10 RX ADMIN — HYDRALAZINE HYDROCHLORIDE 50 MG: 50 TABLET, FILM COATED ORAL at 22:26

## 2018-11-10 RX ADMIN — SALINE NASAL SPRAY 1 SPRAY: 1.5 SOLUTION NASAL at 17:41

## 2018-11-10 RX ADMIN — METHYLPREDNISOLONE SODIUM SUCCINATE 60 MG: 125 INJECTION, POWDER, FOR SOLUTION INTRAMUSCULAR; INTRAVENOUS at 13:40

## 2018-11-10 RX ADMIN — LEVETIRACETAM 500 MG: 100 INJECTION, SOLUTION INTRAVENOUS at 09:22

## 2018-11-10 RX ADMIN — MEROPENEM 1 G: 1 INJECTION, POWDER, FOR SOLUTION INTRAVENOUS at 13:41

## 2018-11-10 RX ADMIN — AMLODIPINE BESYLATE 5 MG: 5 TABLET ORAL at 20:44

## 2018-11-10 RX ADMIN — INSULIN LISPRO 4 UNITS: 100 INJECTION, SOLUTION INTRAVENOUS; SUBCUTANEOUS at 13:31

## 2018-11-10 RX ADMIN — HYDRALAZINE HYDROCHLORIDE 50 MG: 50 TABLET, FILM COATED ORAL at 13:45

## 2018-11-10 RX ADMIN — ALPRAZOLAM 0.25 MG: 0.25 TABLET ORAL at 15:54

## 2018-11-10 RX ADMIN — ASPIRIN 81 MG: 81 TABLET ORAL at 13:40

## 2018-11-10 RX ADMIN — INSULIN LISPRO 2 UNITS: 100 INJECTION, SOLUTION INTRAVENOUS; SUBCUTANEOUS at 09:17

## 2018-11-10 RX ADMIN — SODIUM CHLORIDE: 9 INJECTION, SOLUTION INTRAVENOUS at 15:55

## 2018-11-10 RX ADMIN — CARVEDILOL 25 MG: 25 TABLET, FILM COATED ORAL at 17:40

## 2018-11-10 RX ADMIN — MEROPENEM 1 G: 1 INJECTION, POWDER, FOR SOLUTION INTRAVENOUS at 05:57

## 2018-11-10 RX ADMIN — ONDANSETRON 4 MG: 2 INJECTION INTRAMUSCULAR; INTRAVENOUS at 01:58

## 2018-11-10 RX ADMIN — OXYMETAZOLINE HYDROCHLORIDE 2 SPRAY: 5 SPRAY NASAL at 20:42

## 2018-11-10 RX ADMIN — PANTOPRAZOLE SODIUM 40 MG: 40 TABLET, DELAYED RELEASE ORAL at 09:09

## 2018-11-10 RX ADMIN — MEROPENEM 1 G: 1 INJECTION, POWDER, FOR SOLUTION INTRAVENOUS at 22:26

## 2018-11-10 RX ADMIN — IOPAMIDOL 80 ML: 755 INJECTION, SOLUTION INTRAVENOUS at 14:58

## 2018-11-10 RX ADMIN — PANTOPRAZOLE SODIUM 40 MG: 40 TABLET, DELAYED RELEASE ORAL at 17:40

## 2018-11-10 RX ADMIN — ALPRAZOLAM 0.25 MG: 0.25 TABLET ORAL at 09:09

## 2018-11-10 RX ADMIN — CARVEDILOL 25 MG: 25 TABLET, FILM COATED ORAL at 09:10

## 2018-11-10 RX ADMIN — OXYMETAZOLINE HYDROCHLORIDE 2 SPRAY: 5 SPRAY NASAL at 09:16

## 2018-11-10 RX ADMIN — INSULIN LISPRO 4 UNITS: 100 INJECTION, SOLUTION INTRAVENOUS; SUBCUTANEOUS at 17:45

## 2018-11-10 RX ADMIN — INSULIN LISPRO 3 UNITS: 100 INJECTION, SOLUTION INTRAVENOUS; SUBCUTANEOUS at 20:57

## 2018-11-10 RX ADMIN — ESCITALOPRAM OXALATE 10 MG: 10 TABLET ORAL at 09:09

## 2018-11-10 RX ADMIN — ATORVASTATIN CALCIUM 40 MG: 40 TABLET, FILM COATED ORAL at 20:44

## 2018-11-10 ASSESSMENT — PAIN SCALES - PAIN ASSESSMENT IN ADVANCED DEMENTIA (PAINAD)
NEGVOCALIZATION: 1
TOTALSCORE: 2
FACIALEXPRESSION: 0
FACIALEXPRESSION: 0
BODYLANGUAGE: 0
BODYLANGUAGE: 0
BREATHING: 0
FACIALEXPRESSION: 0
BODYLANGUAGE: 0
NEGVOCALIZATION: 1
TOTALSCORE: 2
BREATHING: 0
TOTALSCORE: 2
CONSOLABILITY: 1
CONSOLABILITY: 1
NEGVOCALIZATION: 1
CONSOLABILITY: 1
BREATHING: 0

## 2018-11-11 LAB
ALBUMIN SERPL-MCNC: 2.3 G/DL (ref 3.4–5)
ALP BLD-CCNC: 80 U/L (ref 40–129)
ALT SERPL-CCNC: 10 U/L (ref 10–40)
ANION GAP SERPL CALCULATED.3IONS-SCNC: 12 MMOL/L (ref 3–16)
AST SERPL-CCNC: 12 U/L (ref 15–37)
BASOPHILS ABSOLUTE: 0 K/UL (ref 0–0.2)
BASOPHILS RELATIVE PERCENT: 0.2 %
BILIRUB SERPL-MCNC: 0.4 MG/DL (ref 0–1)
BILIRUBIN DIRECT: <0.2 MG/DL (ref 0–0.3)
BILIRUBIN, INDIRECT: ABNORMAL MG/DL (ref 0–1)
BUN BLDV-MCNC: 25 MG/DL (ref 7–20)
CALCIUM SERPL-MCNC: 8.4 MG/DL (ref 8.3–10.6)
CHLORIDE BLD-SCNC: 104 MMOL/L (ref 99–110)
CO2: 22 MMOL/L (ref 21–32)
CREAT SERPL-MCNC: 1.2 MG/DL (ref 0.6–1.2)
EOSINOPHILS ABSOLUTE: 0 K/UL (ref 0–0.6)
EOSINOPHILS RELATIVE PERCENT: 0 %
GFR AFRICAN AMERICAN: 54
GFR NON-AFRICAN AMERICAN: 44
GLUCOSE BLD-MCNC: 245 MG/DL (ref 70–99)
GLUCOSE BLD-MCNC: 246 MG/DL (ref 70–99)
GLUCOSE BLD-MCNC: 253 MG/DL (ref 70–99)
GLUCOSE BLD-MCNC: 288 MG/DL (ref 70–99)
GLUCOSE BLD-MCNC: 336 MG/DL (ref 70–99)
HCT VFR BLD CALC: 25.9 % (ref 36–48)
HEMOGLOBIN: 8.8 G/DL (ref 12–16)
LYMPHOCYTES ABSOLUTE: 0.5 K/UL (ref 1–5.1)
LYMPHOCYTES RELATIVE PERCENT: 4.3 %
MCH RBC QN AUTO: 32.3 PG (ref 26–34)
MCHC RBC AUTO-ENTMCNC: 33.9 G/DL (ref 31–36)
MCV RBC AUTO: 95.1 FL (ref 80–100)
MONOCYTES ABSOLUTE: 0.1 K/UL (ref 0–1.3)
MONOCYTES RELATIVE PERCENT: 1.2 %
NEUTROPHILS ABSOLUTE: 9.9 K/UL (ref 1.7–7.7)
NEUTROPHILS RELATIVE PERCENT: 94.3 %
PDW BLD-RTO: 14.1 % (ref 12.4–15.4)
PERFORMED ON: ABNORMAL
PLATELET # BLD: 247 K/UL (ref 135–450)
PMV BLD AUTO: 8.6 FL (ref 5–10.5)
POTASSIUM REFLEX MAGNESIUM: 5 MMOL/L (ref 3.5–5.1)
RBC # BLD: 2.73 M/UL (ref 4–5.2)
SODIUM BLD-SCNC: 138 MMOL/L (ref 136–145)
TOTAL PROTEIN: 5.6 G/DL (ref 6.4–8.2)
WBC # BLD: 10.5 K/UL (ref 4–11)

## 2018-11-11 PROCEDURE — 2580000003 HC RX 258: Performed by: EMERGENCY MEDICINE

## 2018-11-11 PROCEDURE — 85025 COMPLETE CBC W/AUTO DIFF WBC: CPT

## 2018-11-11 PROCEDURE — 6360000002 HC RX W HCPCS: Performed by: INTERNAL MEDICINE

## 2018-11-11 PROCEDURE — 6370000000 HC RX 637 (ALT 250 FOR IP): Performed by: INTERNAL MEDICINE

## 2018-11-11 PROCEDURE — 80048 BASIC METABOLIC PNL TOTAL CA: CPT

## 2018-11-11 PROCEDURE — 6370000000 HC RX 637 (ALT 250 FOR IP): Performed by: STUDENT IN AN ORGANIZED HEALTH CARE EDUCATION/TRAINING PROGRAM

## 2018-11-11 PROCEDURE — 6360000002 HC RX W HCPCS: Performed by: EMERGENCY MEDICINE

## 2018-11-11 PROCEDURE — 2580000003 HC RX 258: Performed by: STUDENT IN AN ORGANIZED HEALTH CARE EDUCATION/TRAINING PROGRAM

## 2018-11-11 PROCEDURE — 80076 HEPATIC FUNCTION PANEL: CPT

## 2018-11-11 PROCEDURE — 6360000002 HC RX W HCPCS: Performed by: STUDENT IN AN ORGANIZED HEALTH CARE EDUCATION/TRAINING PROGRAM

## 2018-11-11 PROCEDURE — 36415 COLL VENOUS BLD VENIPUNCTURE: CPT

## 2018-11-11 PROCEDURE — 1200000000 HC SEMI PRIVATE

## 2018-11-11 RX ORDER — LIDOCAINE 4 G/G
1 PATCH TOPICAL DAILY
Status: DISCONTINUED | OUTPATIENT
Start: 2018-11-12 | End: 2018-11-12

## 2018-11-11 RX ORDER — ALPRAZOLAM 0.25 MG/1
0.25 TABLET ORAL NIGHTLY
Status: DISCONTINUED | OUTPATIENT
Start: 2018-11-11 | End: 2018-11-14 | Stop reason: HOSPADM

## 2018-11-11 RX ORDER — GUAIFENESIN 600 MG/1
600 TABLET, EXTENDED RELEASE ORAL ONCE
Status: COMPLETED | OUTPATIENT
Start: 2018-11-11 | End: 2018-11-11

## 2018-11-11 RX ORDER — GUAIFENESIN 600 MG/1
600 TABLET, EXTENDED RELEASE ORAL 2 TIMES DAILY
Status: DISCONTINUED | OUTPATIENT
Start: 2018-11-11 | End: 2018-11-14 | Stop reason: HOSPADM

## 2018-11-11 RX ORDER — CLOPIDOGREL BISULFATE 75 MG/1
75 TABLET ORAL DAILY
Status: DISCONTINUED | OUTPATIENT
Start: 2018-11-11 | End: 2018-11-12

## 2018-11-11 RX ORDER — OXYCODONE HYDROCHLORIDE AND ACETAMINOPHEN 5; 325 MG/1; MG/1
1 TABLET ORAL ONCE
Status: COMPLETED | OUTPATIENT
Start: 2018-11-12 | End: 2018-11-12

## 2018-11-11 RX ORDER — ALPRAZOLAM 0.25 MG/1
0.12 TABLET ORAL
Status: DISCONTINUED | OUTPATIENT
Start: 2018-11-11 | End: 2018-11-13

## 2018-11-11 RX ADMIN — ESCITALOPRAM OXALATE 10 MG: 10 TABLET ORAL at 09:33

## 2018-11-11 RX ADMIN — MEROPENEM 1 G: 1 INJECTION, POWDER, FOR SOLUTION INTRAVENOUS at 23:18

## 2018-11-11 RX ADMIN — MEROPENEM 1 G: 1 INJECTION, POWDER, FOR SOLUTION INTRAVENOUS at 15:18

## 2018-11-11 RX ADMIN — MEROPENEM 1 G: 1 INJECTION, POWDER, FOR SOLUTION INTRAVENOUS at 06:18

## 2018-11-11 RX ADMIN — ALPRAZOLAM 0.12 MG: 0.25 TABLET ORAL at 16:36

## 2018-11-11 RX ADMIN — GUAIFENESIN 600 MG: 600 TABLET, EXTENDED RELEASE ORAL at 22:29

## 2018-11-11 RX ADMIN — ALPRAZOLAM 0.25 MG: 0.25 TABLET ORAL at 22:29

## 2018-11-11 RX ADMIN — ASPIRIN 81 MG: 81 TABLET ORAL at 09:29

## 2018-11-11 RX ADMIN — PANTOPRAZOLE SODIUM 40 MG: 40 TABLET, DELAYED RELEASE ORAL at 16:35

## 2018-11-11 RX ADMIN — HYDRALAZINE HYDROCHLORIDE 50 MG: 50 TABLET, FILM COATED ORAL at 06:19

## 2018-11-11 RX ADMIN — LEVETIRACETAM 500 MG: 100 INJECTION, SOLUTION INTRAVENOUS at 09:47

## 2018-11-11 RX ADMIN — INSULIN LISPRO 6 UNITS: 100 INJECTION, SOLUTION INTRAVENOUS; SUBCUTANEOUS at 13:01

## 2018-11-11 RX ADMIN — CARVEDILOL 25 MG: 25 TABLET, FILM COATED ORAL at 16:35

## 2018-11-11 RX ADMIN — HYDRALAZINE HYDROCHLORIDE 50 MG: 50 TABLET, FILM COATED ORAL at 22:29

## 2018-11-11 RX ADMIN — ACETAMINOPHEN 650 MG: 325 TABLET, FILM COATED ORAL at 22:29

## 2018-11-11 RX ADMIN — METHYLPREDNISOLONE SODIUM SUCCINATE 60 MG: 125 INJECTION, POWDER, FOR SOLUTION INTRAMUSCULAR; INTRAVENOUS at 09:27

## 2018-11-11 RX ADMIN — INSULIN LISPRO 4 UNITS: 100 INJECTION, SOLUTION INTRAVENOUS; SUBCUTANEOUS at 22:38

## 2018-11-11 RX ADMIN — SALINE NASAL SPRAY 1 SPRAY: 1.5 SOLUTION NASAL at 09:50

## 2018-11-11 RX ADMIN — INSULIN LISPRO 4 UNITS: 100 INJECTION, SOLUTION INTRAVENOUS; SUBCUTANEOUS at 17:01

## 2018-11-11 RX ADMIN — AMLODIPINE BESYLATE 5 MG: 5 TABLET ORAL at 22:29

## 2018-11-11 RX ADMIN — INSULIN LISPRO 6 UNITS: 100 INJECTION, SOLUTION INTRAVENOUS; SUBCUTANEOUS at 09:19

## 2018-11-11 RX ADMIN — LEVETIRACETAM 500 MG: 100 INJECTION, SOLUTION INTRAVENOUS at 22:31

## 2018-11-11 RX ADMIN — GUAIFENESIN 600 MG: 600 TABLET, EXTENDED RELEASE ORAL at 09:33

## 2018-11-11 RX ADMIN — CLOPIDOGREL BISULFATE 75 MG: 75 TABLET ORAL at 11:11

## 2018-11-11 RX ADMIN — PANTOPRAZOLE SODIUM 40 MG: 40 TABLET, DELAYED RELEASE ORAL at 09:29

## 2018-11-11 RX ADMIN — ATORVASTATIN CALCIUM 40 MG: 40 TABLET, FILM COATED ORAL at 23:02

## 2018-11-11 RX ADMIN — CYANOCOBALAMIN 1000 MCG: 1000 INJECTION, SOLUTION INTRAMUSCULAR; SUBCUTANEOUS at 09:44

## 2018-11-11 RX ADMIN — CARVEDILOL 25 MG: 25 TABLET, FILM COATED ORAL at 09:29

## 2018-11-11 ASSESSMENT — PAIN DESCRIPTION - ONSET
ONSET: ON-GOING
ONSET: PROGRESSIVE

## 2018-11-11 ASSESSMENT — PAIN DESCRIPTION - DESCRIPTORS
DESCRIPTORS: ACHING;SORE
DESCRIPTORS: ACHING;SORE

## 2018-11-11 ASSESSMENT — PAIN DESCRIPTION - PAIN TYPE
TYPE: ACUTE PAIN;SURGICAL PAIN
TYPE: CHRONIC PAIN

## 2018-11-11 ASSESSMENT — PAIN DESCRIPTION - PROGRESSION
CLINICAL_PROGRESSION: GRADUALLY WORSENING
CLINICAL_PROGRESSION: GRADUALLY WORSENING

## 2018-11-11 ASSESSMENT — PAIN DESCRIPTION - FREQUENCY: FREQUENCY: CONTINUOUS

## 2018-11-11 ASSESSMENT — PAIN SCALES - PAIN ASSESSMENT IN ADVANCED DEMENTIA (PAINAD)
CONSOLABILITY: 0
BODYLANGUAGE: 0
NEGVOCALIZATION: 0
TOTALSCORE: 0
BREATHING: 0
FACIALEXPRESSION: 0

## 2018-11-11 ASSESSMENT — PAIN DESCRIPTION - LOCATION
LOCATION: BACK
LOCATION: BACK;CHEST

## 2018-11-11 ASSESSMENT — PAIN SCALES - GENERAL
PAINLEVEL_OUTOF10: 5
PAINLEVEL_OUTOF10: 5

## 2018-11-11 ASSESSMENT — PAIN DESCRIPTION - ORIENTATION
ORIENTATION: LOWER
ORIENTATION: MID

## 2018-11-11 NOTE — PROGRESS NOTES
Normal respiratory effort. Clear to auscultation, bilaterally without Rales/Wheezes/Rhonchi. Cardiovascular: Regular rate and rhythm with normal S1/S2 without murmurs, rubs or gallops. Abdomen: Soft, non-tender, non-distended with normal bowel sounds. Musculoskeletal: No clubbing, cyanosis or edema bilaterally. Full range of motion without deformity. Neurologic:  Alert and oriented to person and place, Neurovascularly intact without any focal sensory/motor deficits. Cranial nerves: II-XII intact, grossly non-focal.  Peripheral Pulses: +2 palpable, equal bilaterally       Labs:   Recent Labs      11/09/18   0532  11/10/18   0546  11/11/18   0532   WBC  11.5*  13.3*  10.5   HGB  9.4*  9.2*  8.8*   HCT  27.5*  27.0*  25.9*   PLT  227  227  247     Recent Labs      11/09/18   0531  11/10/18   0546  11/11/18   0532   NA  139  136  138   K  4.4  4.5  5.0   CL  104  102  104   CO2  25  21  22   BUN  17  17  25*   CREATININE  0.8  0.8  1.2   CALCIUM  8.6  8.6  8.4     No results for input(s): AST, ALT, BILIDIR, BILITOT, ALKPHOS in the last 72 hours. Recent Labs      11/09/18   1223   INR  1.29*     No results for input(s): [place] in the last 72 hours. Urinalysis:      Lab Results   Component Value Date    NITRU Negative 11/03/2018    WBCUA 3-5 11/03/2018    BACTERIA 2+ 11/03/2018    RBCUA 3-5 11/03/2018    BLOODU SMALL 11/03/2018    SPECGRAV 1.025 11/03/2018    GLUCOSEU 250 11/03/2018    GLUCOSEU Negative 11/30/2011       Radiology:  CTA NECK W CONTRAST   Final Result   1. Proximal soft and calcific plaque in left common carotid artery, borderline significant stenosis, 50% diameter. CT in this region is limited due to pulsation artifact   2. Proximal right internal carotid artery carotid bulb ulcer   3. Hemodynamic arterial stenosis of the left internal carotid artery is 75% diameter stenosis   4.  Right posterior communicating artery is decent size with marked irregularity and severe disease of the

## 2018-11-11 NOTE — PROGRESS NOTES
Renal Progress Note  Subjective:   Admit Date: 11/3/2018     BP better  Has AMS   Will go for CTA neck     DIET DIET GENERAL; Dental Soft  Medications:   Scheduled Meds:   sodium chloride  1 spray Each Nare 4x daily    pantoprazole  40 mg Oral BID AC    methylPREDNISolone  60 mg Intravenous Daily    aspirin  81 mg Oral Daily    hydrALAZINE  50 mg Oral 3 times per day    oxymetazoline  2 spray Nasal BID    insulin glargine  14 Units Subcutaneous Nightly    amLODIPine  5 mg Oral Nightly    cyanocobalamin  1,000 mcg Subcutaneous Daily    carvedilol  25 mg Oral BID WC    levetiracetam  500 mg Intravenous Q12H    meropenem  1 g Intravenous Q8H    ALPRAZolam  0.25 mg Oral TID    atorvastatin  40 mg Oral Nightly    escitalopram  10 mg Oral Daily    insulin lispro  0-12 Units Subcutaneous TID WC    insulin lispro  0-6 Units Subcutaneous Nightly     Continuous Infusions:   dextrose         Labs:  CBC:   Recent Labs      11/08/18   0614  11/09/18   0532  11/10/18   0546   WBC  10.3  11.5*  13.3*   HGB  9.9*  9.4*  9.2*   PLT  183  227  227     BMP:    Recent Labs      11/08/18   0614  11/09/18   0531  11/10/18   0546   NA  143  139  136   K  5.2*  4.4  4.5   CL  111*  104  102   CO2  21  25  21   BUN  18  17  17   CREATININE  0.9  0.8  0.8   GLUCOSE  131*  145*  145*     Ca/Mg/Phos:   Recent Labs      11/08/18   0614  11/09/18   0531  11/10/18   0546   CALCIUM  8.4  8.6  8.6     Hepatic: No results for input(s): AST, ALT, ALB, BILITOT, ALKPHOS in the last 72 hours. Troponin: No results for input(s): TROPONINI in the last 72 hours. BNP: No results for input(s): BNP in the last 72 hours. Lipids: No results for input(s): CHOL, TRIG, HDL, LDLCALC, LABVLDL in the last 72 hours. ABGs: No results for input(s): PHART, PO2ART, VCS6ZEL in the last 72 hours.   INR:   Recent Labs      11/09/18   1223   INR  1.29*     UA:No results for input(s): Jacoby Raspberry, GLUCOSEU, [facility], Wale Vanegas, [address], Laura Jones,

## 2018-11-12 PROBLEM — R70.0 ELEVATED ERYTHROCYTE SEDIMENTATION RATE: Status: ACTIVE | Noted: 2018-11-12

## 2018-11-12 PROBLEM — R79.82 ELEVATED C-REACTIVE PROTEIN (CRP): Status: ACTIVE | Noted: 2018-11-12

## 2018-11-12 LAB
ALDOSTERONE: 4.8 NG/DL
ANA INTERPRETATION: NORMAL
ANION GAP SERPL CALCULATED.3IONS-SCNC: 11 MMOL/L (ref 3–16)
ANTI-NUCLEAR ANTIBODY (ANA): NEGATIVE
BASOPHILS ABSOLUTE: 0.1 K/UL (ref 0–0.2)
BASOPHILS RELATIVE PERCENT: 0.9 %
BUN BLDV-MCNC: 48 MG/DL (ref 7–20)
CALCIUM SERPL-MCNC: 8.7 MG/DL (ref 8.3–10.6)
CHLORIDE BLD-SCNC: 100 MMOL/L (ref 99–110)
CO2: 24 MMOL/L (ref 21–32)
CREAT SERPL-MCNC: 1.5 MG/DL (ref 0.6–1.2)
EOSINOPHILS ABSOLUTE: 0 K/UL (ref 0–0.6)
EOSINOPHILS RELATIVE PERCENT: 0 %
GFR AFRICAN AMERICAN: 42
GFR NON-AFRICAN AMERICAN: 34
GLUCOSE BLD-MCNC: 257 MG/DL (ref 70–99)
GLUCOSE BLD-MCNC: 267 MG/DL (ref 70–99)
GLUCOSE BLD-MCNC: 307 MG/DL (ref 70–99)
GLUCOSE BLD-MCNC: 319 MG/DL (ref 70–99)
GLUCOSE BLD-MCNC: 355 MG/DL (ref 70–99)
HCT VFR BLD CALC: 27.2 % (ref 36–48)
HEMOGLOBIN: 9.3 G/DL (ref 12–16)
LYMPHOCYTES ABSOLUTE: 0.7 K/UL (ref 1–5.1)
LYMPHOCYTES RELATIVE PERCENT: 5.4 %
MCH RBC QN AUTO: 32.2 PG (ref 26–34)
MCHC RBC AUTO-ENTMCNC: 34.1 G/DL (ref 31–36)
MCV RBC AUTO: 94.4 FL (ref 80–100)
MONOCYTES ABSOLUTE: 0.6 K/UL (ref 0–1.3)
MONOCYTES RELATIVE PERCENT: 4.1 %
NEUTROPHILS ABSOLUTE: 12.2 K/UL (ref 1.7–7.7)
NEUTROPHILS RELATIVE PERCENT: 89.6 %
PDW BLD-RTO: 14.1 % (ref 12.4–15.4)
PERFORMED ON: ABNORMAL
PLATELET # BLD: 321 K/UL (ref 135–450)
PMV BLD AUTO: 8.6 FL (ref 5–10.5)
POTASSIUM REFLEX MAGNESIUM: 5.4 MMOL/L (ref 3.5–5.1)
RBC # BLD: 2.88 M/UL (ref 4–5.2)
RENIN ACTIVITY: 0.4 NG/ML/HR
SODIUM BLD-SCNC: 135 MMOL/L (ref 136–145)
WBC # BLD: 13.6 K/UL (ref 4–11)

## 2018-11-12 PROCEDURE — 99223 1ST HOSP IP/OBS HIGH 75: CPT | Performed by: INTERNAL MEDICINE

## 2018-11-12 PROCEDURE — 2580000003 HC RX 258: Performed by: EMERGENCY MEDICINE

## 2018-11-12 PROCEDURE — 6370000000 HC RX 637 (ALT 250 FOR IP): Performed by: STUDENT IN AN ORGANIZED HEALTH CARE EDUCATION/TRAINING PROGRAM

## 2018-11-12 PROCEDURE — 84165 PROTEIN E-PHORESIS SERUM: CPT

## 2018-11-12 PROCEDURE — 2580000003 HC RX 258: Performed by: STUDENT IN AN ORGANIZED HEALTH CARE EDUCATION/TRAINING PROGRAM

## 2018-11-12 PROCEDURE — 6360000002 HC RX W HCPCS: Performed by: STUDENT IN AN ORGANIZED HEALTH CARE EDUCATION/TRAINING PROGRAM

## 2018-11-12 PROCEDURE — 97140 MANUAL THERAPY 1/> REGIONS: CPT

## 2018-11-12 PROCEDURE — 6370000000 HC RX 637 (ALT 250 FOR IP): Performed by: INTERNAL MEDICINE

## 2018-11-12 PROCEDURE — 1200000000 HC SEMI PRIVATE

## 2018-11-12 PROCEDURE — 97530 THERAPEUTIC ACTIVITIES: CPT

## 2018-11-12 PROCEDURE — 80048 BASIC METABOLIC PNL TOTAL CA: CPT

## 2018-11-12 PROCEDURE — 6360000002 HC RX W HCPCS: Performed by: INTERNAL MEDICINE

## 2018-11-12 PROCEDURE — 6360000002 HC RX W HCPCS: Performed by: EMERGENCY MEDICINE

## 2018-11-12 PROCEDURE — 36415 COLL VENOUS BLD VENIPUNCTURE: CPT

## 2018-11-12 PROCEDURE — 97116 GAIT TRAINING THERAPY: CPT

## 2018-11-12 PROCEDURE — 85025 COMPLETE CBC W/AUTO DIFF WBC: CPT

## 2018-11-12 PROCEDURE — 84155 ASSAY OF PROTEIN SERUM: CPT

## 2018-11-12 PROCEDURE — 97535 SELF CARE MNGMENT TRAINING: CPT

## 2018-11-12 PROCEDURE — 99232 SBSQ HOSP IP/OBS MODERATE 35: CPT | Performed by: INTERNAL MEDICINE

## 2018-11-12 RX ORDER — SODIUM CHLORIDE 9 MG/ML
INJECTION, SOLUTION INTRAVENOUS CONTINUOUS
Status: DISPENSED | OUTPATIENT
Start: 2018-11-12 | End: 2018-11-13

## 2018-11-12 RX ORDER — HYDRALAZINE HYDROCHLORIDE 50 MG/1
100 TABLET, FILM COATED ORAL EVERY 8 HOURS SCHEDULED
Status: DISCONTINUED | OUTPATIENT
Start: 2018-11-12 | End: 2018-11-14 | Stop reason: HOSPADM

## 2018-11-12 RX ORDER — LIDOCAINE 4 G/G
1 PATCH TOPICAL DAILY
Status: DISCONTINUED | OUTPATIENT
Start: 2018-11-12 | End: 2018-11-14 | Stop reason: HOSPADM

## 2018-11-12 RX ADMIN — ONDANSETRON 4 MG: 2 INJECTION INTRAMUSCULAR; INTRAVENOUS at 07:33

## 2018-11-12 RX ADMIN — ACETAMINOPHEN 650 MG: 325 TABLET, FILM COATED ORAL at 17:21

## 2018-11-12 RX ADMIN — GUAIFENESIN 600 MG: 600 TABLET, EXTENDED RELEASE ORAL at 08:53

## 2018-11-12 RX ADMIN — PANTOPRAZOLE SODIUM 40 MG: 40 TABLET, DELAYED RELEASE ORAL at 06:54

## 2018-11-12 RX ADMIN — SALINE NASAL SPRAY 1 SPRAY: 1.5 SOLUTION NASAL at 12:24

## 2018-11-12 RX ADMIN — SALINE NASAL SPRAY 1 SPRAY: 1.5 SOLUTION NASAL at 21:36

## 2018-11-12 RX ADMIN — ALPRAZOLAM 0.25 MG: 0.25 TABLET ORAL at 21:28

## 2018-11-12 RX ADMIN — ALPRAZOLAM 0.12 MG: 0.25 TABLET ORAL at 08:52

## 2018-11-12 RX ADMIN — AMLODIPINE BESYLATE 5 MG: 5 TABLET ORAL at 21:29

## 2018-11-12 RX ADMIN — HYDRALAZINE HYDROCHLORIDE 50 MG: 50 TABLET, FILM COATED ORAL at 06:53

## 2018-11-12 RX ADMIN — HYDRALAZINE HYDROCHLORIDE 100 MG: 50 TABLET, FILM COATED ORAL at 21:28

## 2018-11-12 RX ADMIN — ASPIRIN 81 MG: 81 TABLET ORAL at 08:53

## 2018-11-12 RX ADMIN — ALPRAZOLAM 0.12 MG: 0.25 TABLET ORAL at 14:48

## 2018-11-12 RX ADMIN — ATORVASTATIN CALCIUM 40 MG: 40 TABLET, FILM COATED ORAL at 21:28

## 2018-11-12 RX ADMIN — INSULIN LISPRO 12 UNITS: 100 INJECTION, SOLUTION INTRAVENOUS; SUBCUTANEOUS at 18:31

## 2018-11-12 RX ADMIN — LEVETIRACETAM 500 MG: 100 INJECTION, SOLUTION INTRAVENOUS at 21:28

## 2018-11-12 RX ADMIN — SODIUM CHLORIDE: 9 INJECTION, SOLUTION INTRAVENOUS at 13:23

## 2018-11-12 RX ADMIN — INSULIN LISPRO 5 UNITS: 100 INJECTION, SOLUTION INTRAVENOUS; SUBCUTANEOUS at 22:42

## 2018-11-12 RX ADMIN — CARVEDILOL 25 MG: 25 TABLET, FILM COATED ORAL at 17:21

## 2018-11-12 RX ADMIN — LEVETIRACETAM 500 MG: 100 INJECTION, SOLUTION INTRAVENOUS at 09:05

## 2018-11-12 RX ADMIN — SALINE NASAL SPRAY 1 SPRAY: 1.5 SOLUTION NASAL at 17:22

## 2018-11-12 RX ADMIN — HYDRALAZINE HYDROCHLORIDE 100 MG: 50 TABLET, FILM COATED ORAL at 14:48

## 2018-11-12 RX ADMIN — OXYCODONE HYDROCHLORIDE AND ACETAMINOPHEN 1 TABLET: 5; 325 TABLET ORAL at 00:10

## 2018-11-12 RX ADMIN — INSULIN LISPRO 12 UNITS: 100 INJECTION, SOLUTION INTRAVENOUS; SUBCUTANEOUS at 08:34

## 2018-11-12 RX ADMIN — SALINE NASAL SPRAY 1 SPRAY: 1.5 SOLUTION NASAL at 08:39

## 2018-11-12 RX ADMIN — ESCITALOPRAM OXALATE 10 MG: 10 TABLET ORAL at 08:52

## 2018-11-12 RX ADMIN — CYANOCOBALAMIN 1000 MCG: 1000 INJECTION, SOLUTION INTRAMUSCULAR; SUBCUTANEOUS at 12:23

## 2018-11-12 RX ADMIN — MEROPENEM 1 G: 1 INJECTION, POWDER, FOR SOLUTION INTRAVENOUS at 06:54

## 2018-11-12 RX ADMIN — CARVEDILOL 25 MG: 25 TABLET, FILM COATED ORAL at 08:53

## 2018-11-12 RX ADMIN — PANTOPRAZOLE SODIUM 40 MG: 40 TABLET, DELAYED RELEASE ORAL at 17:20

## 2018-11-12 RX ADMIN — CLOPIDOGREL BISULFATE 75 MG: 75 TABLET ORAL at 08:53

## 2018-11-12 RX ADMIN — INSULIN LISPRO 9 UNITS: 100 INJECTION, SOLUTION INTRAVENOUS; SUBCUTANEOUS at 12:13

## 2018-11-12 RX ADMIN — GUAIFENESIN 600 MG: 600 TABLET, EXTENDED RELEASE ORAL at 21:28

## 2018-11-12 RX ADMIN — METHYLPREDNISOLONE SODIUM SUCCINATE 60 MG: 125 INJECTION, POWDER, FOR SOLUTION INTRAMUSCULAR; INTRAVENOUS at 09:47

## 2018-11-12 ASSESSMENT — PAIN DESCRIPTION - FREQUENCY
FREQUENCY: CONTINUOUS
FREQUENCY: CONTINUOUS

## 2018-11-12 ASSESSMENT — PAIN SCALES - GENERAL
PAINLEVEL_OUTOF10: 5
PAINLEVEL_OUTOF10: 5
PAINLEVEL_OUTOF10: 3

## 2018-11-12 ASSESSMENT — PAIN DESCRIPTION - ONSET
ONSET: ON-GOING
ONSET: ON-GOING

## 2018-11-12 ASSESSMENT — PAIN DESCRIPTION - LOCATION
LOCATION: BACK
LOCATION: BACK;CHEST

## 2018-11-12 ASSESSMENT — PAIN DESCRIPTION - PROGRESSION
CLINICAL_PROGRESSION: GRADUALLY WORSENING

## 2018-11-12 ASSESSMENT — PAIN DESCRIPTION - ORIENTATION
ORIENTATION: MID
ORIENTATION: LOWER

## 2018-11-12 ASSESSMENT — PAIN DESCRIPTION - DESCRIPTORS
DESCRIPTORS: ACHING;SORE
DESCRIPTORS: ACHING

## 2018-11-12 ASSESSMENT — PAIN DESCRIPTION - PAIN TYPE
TYPE: CHRONIC PAIN
TYPE: ACUTE PAIN;SURGICAL PAIN

## 2018-11-12 NOTE — PROGRESS NOTES
Physical Therapy  Facility/Department: [facility] 5T ORTHO/NEURO  Daily Treatment Note  NAME: Lala Lynch  : 1950  MRN: [id number]    Date of Service: 2018    Discharge Recommendations: Lala Lynch scored a  on the AM-PAC short mobility form. Current research shows that an AM-PAC score of 17 or less is typically not associated with a discharge to the patient's home setting. Based on the patients AM-PAC score and their current functional mobility deficits, it is recommended that the patient have 3-5 sessions per week of Physical Therapy at d/c to increase the patients independence. PT Equipment Recommendations  Equipment Needed: No  Other: defer to next level of care    Patient Diagnosis(es): The primary encounter diagnosis was Seizure [facility]). Diagnoses of Non-intractable vomiting with nausea, unspecified vomiting type, Nonintractable headache, unspecified chronicity pattern, unspecified headache type, and Pneumonia due to organism were also pertinent to this visit. has a past medical history of Adenomatous colon polyp; Adrenal nodule ([address] Allergic rhinitis; Anxiety; Back pain; Bacterial infection due to Staphylococcus aureus; Blood transfusion; CAD (coronary artery disease); Cancer of sigmoid colon ([address] Depressed; Diabetes mellitus type II; Dry skin; Fibromyalgia; GERD (gastroesophageal reflux disease); History of blood transfusion; HTN (hypertension); Hyperlipemia; Infection of prosthetic vascular graft ([address] Insomnia; Lumbar disc disease; Lumbar spinal stenosis; Memory loss; Numbness; Osteopenia; Peripheral vascular disease ([address] PONV (postoperative nausea and vomiting); Renal artery stenosis ([address] and S/P vascular surgery. has a past surgical history that includes Ankle fracture surgery (10/03); Aorto-femoral Bypass Graft (); Toe amputation (); IR Femoral Popliteal Bypass Graft ();  Tubal ligation; Bypass Graft (Left, 2011); other surgical history (Left, Timed Code Treatment Minutes:  38    Total Treatment Minutes:  45    If patient is discharged prior to next treatment, this note will serve as the discharge summary.   Gloria Valencia, PT, DPT  [phone]

## 2018-11-12 NOTE — PROGRESS NOTES
resolved    2. Hypoxemia - On 2 L NC.  Start IS and wean as tolerated with goal O2 sat of 88%    Will follow peripherally    Dixie Edu

## 2018-11-12 NOTE — PROGRESS NOTES
72 hours. INR:   Recent Labs      11/09/18   1223   INR  1.29*     UA:No results for input(s): Vista [place], GLUCOSEU, BILIRUBINUR, [place], BLOODU, PHUR, PROTEINU, UROBILINOGEN, NITRU, LEUKOCYTESUR, Maci [place] in the last 72 hours. Urine Microscopic: No results for input(s): LABCAST, BACTERIA, COMU, HYALCAST, WBCUA, RBCUA, EPIU in the last 72 hours. Urine Culture: No results for input(s): LABURIN in the last 72 hours. Urine Chemistry: No results for input(s): [place], PROTEINUR, NAUR in the last 72 hours. Objective:   Vitals: BP (!) 165/71   Pulse 84   Temp 97.9 °F (36.6 °C) (Oral)   Resp 16   Ht 5' 5\" (1.651 m)   Wt 190 lb (86.2 kg)   SpO2 94%   Breastfeeding? No   BMI 31.62 kg/m²    Wt Readings from Last 3 Encounters:   11/03/18 190 lb (86.2 kg)   09/16/18 190 lb (86.2 kg)   07/26/18 193 lb (87.5 kg)      24HR INTAKE/OUTPUT:      Intake/Output Summary (Last 24 hours) at 11/12/18 1020  Last data filed at 11/11/18 2231   Gross per 24 hour   Intake                0 ml   Output              200 ml   Net             -200 ml     Constitutional:  Alert, awake, no apparent distress, slight confusion  NECK: supple, no JVD  Cardiovascular:  S1, S2 without m/r/g  Respiratory:  CTA B without w/r/r  Abdomen: +bs, soft, nt  Ext: No LE edema      Assessment and Plan:     IMAGING:  CTA NECK W CONTRAST   Final Result   1. Proximal soft and calcific plaque in left common carotid artery, borderline significant stenosis, 50% diameter. CT in this region is limited due to pulsation artifact   2. Proximal right internal carotid artery carotid bulb ulcer   3. Hemodynamic arterial stenosis of the left internal carotid artery is 75% diameter stenosis   4. Right posterior communicating artery is decent size with marked irregularity and severe disease of the right P2 segment.  Consideration is given to origin infarct from the right carotid through the posterior communicating artery to the posterior

## 2018-11-12 NOTE — PROGRESS NOTES
enthesophyte. XR CHEST PORTABLE   Final Result      1. Mild new consolidation of the right mid and lower lung zones. 2. Sclerotic lesion of the right humeral head progressed dating back to chest x-ray from 9/10/2010, favored to be benign. XR CHEST PORTABLE   Final Result      Right lower lobe atelectasis and patchy pneumonia. CT ABDOMEN PELVIS W IV CONTRAST Additional Contrast? None   Final Result      No change in 3 cm x 4.2 cm right adrenal mass. Extensive arterial wall calcification. No change in small aneurysm left common iliac artery. No abnormal bowel dilatation or wall thickening. No acute abnormality or abnormal fluid collection. CT Head WO Contrast   Final Result      1. Subacute infarct in the right occipital lobe. 2.  No acute intracranial hemorrhage or mass effect. MRI BRAIN W WO CONTRAST    (Results Pending)   CTA PULMONARY W CONTRAST    (Results Pending)     Assessment/Plan   # Resistant HTN -better   - cont. Amlodipine, coreg  - cont.  Hydralazine  - f/u renin/aldosterone labs  - Monitor, slow BP decrease    # Anemia - Hgb downtrending  - Monitor  - Replace    # Acid- base/ Electrolytes - VBG WNL, lactate downtrended  - K improved    #Asp PNA   - abx     #Recent CVA c/b Sz  - AED's     # CKDSt3  - Cr stable    Funmilayo Carbajal MD

## 2018-11-12 NOTE — CONSULTS
repair    OTHER SURGICAL HISTORY Left 07/08/2016    Dr. Chip Souza - excision infected femoral to BK popliteal prosthetic graft, patch angioplasty L CFA w/endarterectomied popliteal artery, sartorius muscle flap closure of L groin wound    OTHER SURGICAL HISTORY Left 07/14/2016    Dr. Chip Souza - delayed primary closure of 2 LLE wounds, placement of wound vac    SIGMOIDECTOMY  12/3/2015    Dr. Liliana Lozoya - laparascopic, w/mobilization of splenic flexure, extensive lysis of adhesions, ileostomy creation & rigid sigmoidoscopy    TOE AMPUTATION  11/06    TUBAL LIGATION      TUNNELED VENOUS CATHETER PLACEMENT Left 07/12/2016    Dr. Pio Andersen - [facility] Left 12/1/2011    Dr. Chip Souza - vein patch angioplasty of proximal anastamosis & interposition cephalic vein graft revision of distal anastamosis of L fem-PT bypass graft,        Current Medications:     lidocaine  1 patch Transdermal Daily    hydrALAZINE  100 mg Oral 3 times per day    ALPRAZolam  0.25 mg Oral Nightly    ALPRAZolam  0.125 mg Oral 2 times per day    guaiFENesin  600 mg Oral BID    insulin glargine  18 Units Subcutaneous Nightly    insulin lispro  0-18 Units Subcutaneous TID WC    insulin lispro  0-9 Units Subcutaneous Nightly    sodium chloride  1 spray Each Nare 4x daily    pantoprazole  40 mg Oral BID AC    aspirin  81 mg Oral Daily    amLODIPine  5 mg Oral Nightly    carvedilol  25 mg Oral BID WC    levetiracetam  500 mg Intravenous Q12H    atorvastatin  40 mg Oral Nightly    escitalopram  10 mg Oral Daily       Allergies:  Latex; Adhesive tape; Cephalexin; Chocolate; Peanut-containing drug products; Ativan [lorazepam]; Codeine; Dilaudid [hydromorphone hcl]; and Penicillins    Social History:    TOBACCO:    denies  ETOH:    denies  DRUGS:   denies  MARITAL STATUS:   Single  OCCUPATION:   Retired     Family History:   No immunodeficiency    REVIEW OF SYSTEMS:    No fever / chills / sweats.   + weight lower lobe atelectasis and patchy pneumonia. R foot Xray:   Impression:     1. No acute osseous abnormality. 2. Mild first MTP arthrosis and mild hindfoot arthrosis. 3. Plantar calcaneal enthesophyte. Head CT   Impression:     No acute or interval change    CT Head and neck w contrast:   Impression:     1. Proximal soft and calcific plaque in left common carotid artery, borderline significant stenosis, 50% diameter. CT in this region is limited due to pulsation artifact  2. Proximal right internal carotid artery carotid bulb ulcer  3. Hemodynamic arterial stenosis of the left internal carotid artery is 75% diameter stenosis  4. Right posterior communicating artery is decent size with marked irregularity and severe disease of the right P2 segment. Consideration is given to origin infarct from the right carotid through the posterior communicating artery to the posterior   cerebral P2 segment  5.  No abnormal intracranial enhancement or early draining veins  Incidentally, moderate bilateral pleural effusions are present      IMPRESSION:      Patient Active Problem List   Diagnosis    Hyperlipidemia, unspecified    Coronary artery disease involving native coronary artery of native heart without angina pectoris    Essential hypertension    Peripheral vascular disease ([address]    Type II or unspecified type diabetes mellitus without mention of complication, not stated as uncontrolled    Fibromyalgia    Lumbar spinal stenosis    Adrenal nodule ([address]    Bacterial infection due to Staphylococcus aureus    Osteopenia    Vitamin D deficiency    Melena    Chronic kidney disease, stage III (moderate) (HCC)    Chest pain    Pedal edema    Abdominal pain    Unstable angina (HCC)    Carotid bruit (Right)    Drug dependence (HCC)    Major depressive disorder, single episode    Lower limb amputation, other toe(s)    Type II or unspecified type diabetes mellitus with neurological manifestations, not stated as reviewed the current history, physical findings, labs and assessment and plan and agree with note as documented by resident (Dr. Tiago Sierra). 76 yo woman with mult medical problems including CVAs. Hx gout per pt. Admit 11/3 with seizures (witnessed in hospital). Pt has been treated for aspiration pneumonia, meropenem through 11/12. Pt has been on decadron x 3 days. Afebrile, 2L. WBC 13.6. ESR >120,  (11/10)  Pt is weak. Awake, alert, conversant. Mild R foot swelling, localized at MTP. IMP/  Medically complicated  Hospitalized - has received steroids, has had clinically dx gout    Elevated ESR / CRP - multifactorial.  Unclear if she had active infection.     - pneumonia, gout attack, steroid   - hx LBP, no change in vision, no HA (doubt temporal arteritis; question PMR)    REC/  No antibiotics at this time  Monitor      Discussed with pt, daughter, Aayush Oneil MD

## 2018-11-12 NOTE — CARE COORDINATION
Luis Miguel Barreto from [employer] is calling the pt has services thru them ans she can be reached at [phone].

## 2018-11-12 NOTE — PROGRESS NOTES
due to poor PO intake  - HDSSI  - hypoglycemic protocol  - POCT glu qHS and AC     Anxiety  - continue home xanax     HLD  - continue home Atorvastatin    DVT Prophylaxis: Lovenox  Diet: DIET GENERAL; Dental Soft  Code Status: Full Code    PT/OT Eval Status: 13/24, PT 10/24 on [address] [facility] - Floor    I will discuss the patient with the senior resident and MD Debo Arriola MD.   Internal Medicine Resident PGY-1  Pager: [phone]

## 2018-11-13 LAB
ANCA IFA: NORMAL
ANION GAP SERPL CALCULATED.3IONS-SCNC: 10 MMOL/L (ref 3–16)
BASOPHILS ABSOLUTE: 0 K/UL (ref 0–0.2)
BASOPHILS RELATIVE PERCENT: 0 %
BUN BLDV-MCNC: 61 MG/DL (ref 7–20)
CALCIUM SERPL-MCNC: 8.2 MG/DL (ref 8.3–10.6)
CHLORIDE BLD-SCNC: 105 MMOL/L (ref 99–110)
CO2: 21 MMOL/L (ref 21–32)
CREAT SERPL-MCNC: 1.5 MG/DL (ref 0.6–1.2)
EOSINOPHILS ABSOLUTE: 0 K/UL (ref 0–0.6)
EOSINOPHILS RELATIVE PERCENT: 0 %
GFR AFRICAN AMERICAN: 42
GFR NON-AFRICAN AMERICAN: 34
GLUCOSE BLD-MCNC: 198 MG/DL (ref 70–99)
GLUCOSE BLD-MCNC: 204 MG/DL (ref 70–99)
GLUCOSE BLD-MCNC: 207 MG/DL (ref 70–99)
GLUCOSE BLD-MCNC: 216 MG/DL (ref 70–99)
GLUCOSE BLD-MCNC: 263 MG/DL (ref 70–99)
HCT VFR BLD CALC: 26 % (ref 36–48)
HEMOGLOBIN: 8.6 G/DL (ref 12–16)
LYMPHOCYTES ABSOLUTE: 1.2 K/UL (ref 1–5.1)
LYMPHOCYTES RELATIVE PERCENT: 9 %
MCH RBC QN AUTO: 31.6 PG (ref 26–34)
MCHC RBC AUTO-ENTMCNC: 33.1 G/DL (ref 31–36)
MCV RBC AUTO: 95.6 FL (ref 80–100)
MONOCYTES ABSOLUTE: 0.4 K/UL (ref 0–1.3)
MONOCYTES RELATIVE PERCENT: 3 %
MYELOCYTE PERCENT: 1 %
NEUTROPHILS ABSOLUTE: 11.8 K/UL (ref 1.7–7.7)
NEUTROPHILS RELATIVE PERCENT: 87 %
PDW BLD-RTO: 14.2 % (ref 12.4–15.4)
PERFORMED ON: ABNORMAL
PLATELET # BLD: 330 K/UL (ref 135–450)
PMV BLD AUTO: 8.2 FL (ref 5–10.5)
POTASSIUM REFLEX MAGNESIUM: 5.1 MMOL/L (ref 3.5–5.1)
RBC # BLD: 2.72 M/UL (ref 4–5.2)
SODIUM BLD-SCNC: 136 MMOL/L (ref 136–145)
WBC # BLD: 13.4 K/UL (ref 4–11)

## 2018-11-13 PROCEDURE — 2580000003 HC RX 258: Performed by: STUDENT IN AN ORGANIZED HEALTH CARE EDUCATION/TRAINING PROGRAM

## 2018-11-13 PROCEDURE — 6370000000 HC RX 637 (ALT 250 FOR IP): Performed by: INTERNAL MEDICINE

## 2018-11-13 PROCEDURE — 99232 SBSQ HOSP IP/OBS MODERATE 35: CPT | Performed by: INTERNAL MEDICINE

## 2018-11-13 PROCEDURE — 36415 COLL VENOUS BLD VENIPUNCTURE: CPT

## 2018-11-13 PROCEDURE — 6370000000 HC RX 637 (ALT 250 FOR IP): Performed by: STUDENT IN AN ORGANIZED HEALTH CARE EDUCATION/TRAINING PROGRAM

## 2018-11-13 PROCEDURE — 1200000000 HC SEMI PRIVATE

## 2018-11-13 PROCEDURE — 85025 COMPLETE CBC W/AUTO DIFF WBC: CPT

## 2018-11-13 PROCEDURE — 6360000002 HC RX W HCPCS: Performed by: STUDENT IN AN ORGANIZED HEALTH CARE EDUCATION/TRAINING PROGRAM

## 2018-11-13 PROCEDURE — 80048 BASIC METABOLIC PNL TOTAL CA: CPT

## 2018-11-13 PROCEDURE — 97535 SELF CARE MNGMENT TRAINING: CPT

## 2018-11-13 RX ORDER — ALPRAZOLAM 0.25 MG/1
0.12 TABLET ORAL DAILY PRN
Status: DISCONTINUED | OUTPATIENT
Start: 2018-11-13 | End: 2018-11-14 | Stop reason: HOSPADM

## 2018-11-13 RX ADMIN — PANTOPRAZOLE SODIUM 40 MG: 40 TABLET, DELAYED RELEASE ORAL at 16:56

## 2018-11-13 RX ADMIN — GUAIFENESIN 600 MG: 600 TABLET, EXTENDED RELEASE ORAL at 08:37

## 2018-11-13 RX ADMIN — CARVEDILOL 25 MG: 25 TABLET, FILM COATED ORAL at 16:56

## 2018-11-13 RX ADMIN — HYDRALAZINE HYDROCHLORIDE 100 MG: 50 TABLET, FILM COATED ORAL at 06:10

## 2018-11-13 RX ADMIN — ALPRAZOLAM 0.25 MG: 0.25 TABLET ORAL at 21:14

## 2018-11-13 RX ADMIN — SALINE NASAL SPRAY 1 SPRAY: 1.5 SOLUTION NASAL at 21:13

## 2018-11-13 RX ADMIN — SALINE NASAL SPRAY 1 SPRAY: 1.5 SOLUTION NASAL at 17:00

## 2018-11-13 RX ADMIN — INSULIN LISPRO 6 UNITS: 100 INJECTION, SOLUTION INTRAVENOUS; SUBCUTANEOUS at 08:47

## 2018-11-13 RX ADMIN — ESCITALOPRAM OXALATE 10 MG: 10 TABLET ORAL at 08:37

## 2018-11-13 RX ADMIN — HYDRALAZINE HYDROCHLORIDE 100 MG: 50 TABLET, FILM COATED ORAL at 14:04

## 2018-11-13 RX ADMIN — INSULIN LISPRO 6 UNITS: 100 INJECTION, SOLUTION INTRAVENOUS; SUBCUTANEOUS at 12:28

## 2018-11-13 RX ADMIN — INSULIN LISPRO 5 UNITS: 100 INJECTION, SOLUTION INTRAVENOUS; SUBCUTANEOUS at 21:14

## 2018-11-13 RX ADMIN — SODIUM CHLORIDE: 9 INJECTION, SOLUTION INTRAVENOUS at 02:53

## 2018-11-13 RX ADMIN — HYDRALAZINE HYDROCHLORIDE 100 MG: 50 TABLET, FILM COATED ORAL at 21:13

## 2018-11-13 RX ADMIN — CARVEDILOL 25 MG: 25 TABLET, FILM COATED ORAL at 08:37

## 2018-11-13 RX ADMIN — LEVETIRACETAM 500 MG: 100 INJECTION, SOLUTION INTRAVENOUS at 21:13

## 2018-11-13 RX ADMIN — SALINE NASAL SPRAY 1 SPRAY: 1.5 SOLUTION NASAL at 12:28

## 2018-11-13 RX ADMIN — GUAIFENESIN 600 MG: 600 TABLET, EXTENDED RELEASE ORAL at 21:14

## 2018-11-13 RX ADMIN — ALPRAZOLAM 0.12 MG: 0.25 TABLET ORAL at 08:43

## 2018-11-13 RX ADMIN — AMLODIPINE BESYLATE 5 MG: 5 TABLET ORAL at 21:14

## 2018-11-13 RX ADMIN — LEVETIRACETAM 500 MG: 100 INJECTION, SOLUTION INTRAVENOUS at 08:46

## 2018-11-13 RX ADMIN — ASPIRIN 81 MG: 81 TABLET ORAL at 08:37

## 2018-11-13 RX ADMIN — ACETAMINOPHEN 650 MG: 325 TABLET, FILM COATED ORAL at 16:56

## 2018-11-13 RX ADMIN — PANTOPRAZOLE SODIUM 40 MG: 40 TABLET, DELAYED RELEASE ORAL at 06:10

## 2018-11-13 RX ADMIN — INSULIN LISPRO 6 UNITS: 100 INJECTION, SOLUTION INTRAVENOUS; SUBCUTANEOUS at 17:47

## 2018-11-13 RX ADMIN — ATORVASTATIN CALCIUM 40 MG: 40 TABLET, FILM COATED ORAL at 21:14

## 2018-11-13 ASSESSMENT — PAIN DESCRIPTION - PROGRESSION: CLINICAL_PROGRESSION: GRADUALLY WORSENING

## 2018-11-13 ASSESSMENT — PAIN SCALES - GENERAL
PAINLEVEL_OUTOF10: 0
PAINLEVEL_OUTOF10: 0
PAINLEVEL_OUTOF10: 3

## 2018-11-13 ASSESSMENT — PAIN DESCRIPTION - ONSET: ONSET: ON-GOING

## 2018-11-13 ASSESSMENT — PAIN DESCRIPTION - PAIN TYPE: TYPE: ACUTE PAIN

## 2018-11-13 ASSESSMENT — PAIN SCALES - PAIN ASSESSMENT IN ADVANCED DEMENTIA (PAINAD)
BREATHING: 0
TOTALSCORE: 0
NEGVOCALIZATION: 0
TOTALSCORE: 0
NEGVOCALIZATION: 0
CONSOLABILITY: 0
BODYLANGUAGE: 0
BREATHING: 0
FACIALEXPRESSION: 0
FACIALEXPRESSION: 0
BODYLANGUAGE: 0
CONSOLABILITY: 0

## 2018-11-13 ASSESSMENT — PAIN DESCRIPTION - DESCRIPTORS: DESCRIPTORS: ACHING;DISCOMFORT

## 2018-11-13 ASSESSMENT — PAIN DESCRIPTION - LOCATION: LOCATION: FOOT

## 2018-11-13 ASSESSMENT — PAIN DESCRIPTION - ORIENTATION: ORIENTATION: RIGHT

## 2018-11-13 ASSESSMENT — PAIN DESCRIPTION - FREQUENCY: FREQUENCY: INTERMITTENT

## 2018-11-13 NOTE — PROGRESS NOTES
Internal Medicine PGY-1 Resident Progress Note        PCP: Fidel Watts MD    Date of Admission: 11/3/2018    Chief Complaint: Seizure    Subjective: No acute events overnight. Improved pain in back and right foot. Sleeping well. No other complaints. No hemoptysis. Medications:  Reviewed    Infusion Medications    dextrose       Scheduled Medications    lidocaine  1 patch Transdermal Daily    hydrALAZINE  100 mg Oral 3 times per day    ALPRAZolam  0.25 mg Oral Nightly    guaiFENesin  600 mg Oral BID    insulin glargine  18 Units Subcutaneous Nightly    insulin lispro  0-18 Units Subcutaneous TID WC    insulin lispro  0-9 Units Subcutaneous Nightly    sodium chloride  1 spray Each Nare 4x daily    pantoprazole  40 mg Oral BID AC    aspirin  81 mg Oral Daily    amLODIPine  5 mg Oral Nightly    carvedilol  25 mg Oral BID WC    levetiracetam  500 mg Intravenous Q12H    atorvastatin  40 mg Oral Nightly    escitalopram  10 mg Oral Daily     PRN Meds: ALPRAZolam, ondansetron, labetalol, acetaminophen, glucose, dextrose, glucagon (rDNA), dextrose    No intake or output data in the 24 hours ending 11/13/18 1252    Physical Exam Performed:    BP (!) 124/49   Pulse 75   Temp 98.9 °F (37.2 °C) (Oral)   Resp 18   Ht 5' 5\" (1.651 m)   Wt 190 lb (86.2 kg)   SpO2 95%   Breastfeeding? No   BMI 31.62 kg/m²     General appearance: No apparent distress, appears stated age and cooperative. HEENT: Pupils equal, round, and reactive to light. Conjunctivae/corneas clear. Neck: Supple, with full range of motion. No jugular venous distention. Trachea midline. Respiratory:  Normal respiratory effort. Clear to auscultation, bilaterally without Rales/Wheezes/Rhonchi. Cardiovascular: Regular rate and rhythm with normal S1/S2 without murmurs, rubs or gallops. Abdomen: Soft, non-tender, non-distended with normal bowel sounds. Musculoskeletal: No clubbing, cyanosis or edema bilaterally.   Full range of motion

## 2018-11-13 NOTE — PROGRESS NOTES
ID Follow-up NOTE  RESIDENT NOTE - reviewed / edited, attending note at bottom    CC:   Seizures, elevated ESR / CRP  Antibiotics: None    Admit Date: 11/3/2018  Hospital Day: 11    Subjective:     Patient seen at bedside this morning. Doing okay overall. Patient states she has no chills or fevers overnight. No foot pain. Some back pain, but not ore than usual. Patient without questions or concerns. Patient with cough. Not bringing anything up at this time. No change in her breathing. Interim history  - no fevers overnight. Objective:     Patient Vitals for the past 8 hrs:   BP Temp Temp src Pulse Resp SpO2   11/13/18 0832 (!) 120/52 98.6 °F (37 °C) Oral 78 18 94 %   11/13/18 0610 129/72 - - - - -   11/13/18 0257 (!) 147/61 98.3 °F (36.8 °C) Oral 76 16 92 %     I/O last 3 completed shifts: In: 360 [P.O.:360]  Out: -   No intake/output data recorded. EXAM:  GENERAL: No apparent distress.     HEENT: Membranes moist, no oral lesion  NECK:  Supple  LUNGS: Clear b/l, no rales, no dullness  CARDIAC: RRR, no murmur appreciated  ABD:  + BS, soft / NT  EXT:  No rash, no edema, no lesions  NEURO: No focal neurologic findings  PSYCH: Orientation, sensorium, mood normal  LINES:  Peripheral iv       Data Review:  Lab Results   Component Value Date    WBC 13.4 (H) 11/13/2018    HGB 8.6 (L) 11/13/2018    HCT 26.0 (L) 11/13/2018    MCV 95.6 11/13/2018     11/13/2018     Lab Results   Component Value Date    CREATININE 1.5 (H) 11/13/2018    BUN 61 (H) 11/13/2018     11/13/2018    K 5.1 11/13/2018     11/13/2018    CO2 21 11/13/2018       Hepatic Function Panel: Lab Results   Component Value Date    ALKPHOS 80 11/11/2018    ALT 10 11/11/2018    AST 12 11/11/2018    PROT 5.7 11/12/2018    PROT 6.7 10/30/2012    BILITOT 0.4 11/11/2018    BILIDIR <0.2 11/11/2018    IBILI see below 11/11/2018    LABALBU 2.3 11/11/2018       MICRO:  Blood Culture 1&2 No growth for 5 days  Rapid influenza: negative    IMAGING:  CXR 11/03:              Impression:                   Right lower lobe atelectasis and patchy pneumonia.     R foot Xray:              Impression:       1. No acute osseous abnormality. 2. Mild first MTP arthrosis and mild hindfoot arthrosis. 3. Plantar calcaneal enthesophyte.     Head CT              Impression:       No acute or interval change     CT Head and neck w contrast:              Impression:       1. Proximal soft and calcific plaque in left common carotid artery, borderline significant stenosis, 50% diameter. CT in this region is limited due to pulsation artifact  2. Proximal right internal carotid artery carotid bulb ulcer  3. Hemodynamic arterial stenosis of the left internal carotid artery is 75% diameter stenosis  4. Right posterior communicating artery is decent size with marked irregularity and severe disease of the right P2 segment. Consideration is given to origin infarct from the right carotid through the posterior communicating artery to the posterior   cerebral P2 segment  5.  No abnormal intracranial enhancement or early draining veins  Incidentally, moderate bilateral pleural effusions are present    Scheduled Meds:   lidocaine  1 patch Transdermal Daily    hydrALAZINE  100 mg Oral 3 times per day    ALPRAZolam  0.25 mg Oral Nightly    ALPRAZolam  0.125 mg Oral 2 times per day    guaiFENesin  600 mg Oral BID    insulin glargine  18 Units Subcutaneous Nightly    insulin lispro  0-18 Units Subcutaneous TID WC    insulin lispro  0-9 Units Subcutaneous Nightly    sodium chloride  1 spray Each Nare 4x daily    pantoprazole  40 mg Oral BID AC    aspirin  81 mg Oral Daily    amLODIPine  5 mg Oral Nightly    carvedilol  25 mg Oral BID WC    levetiracetam  500 mg Intravenous Q12H    atorvastatin  40 mg Oral Nightly    escitalopram  10 mg Oral Daily       Continuous Infusions:   dextrose         PRN Meds:  ondansetron, labetalol, acetaminophen, glucose,

## 2018-11-13 NOTE — PROGRESS NOTES
Patient is a/o x2. Patient denies c/o nausea/pain. VSS. Non-skid socks on, SCD'S remain in place. Fall precautions in place, camera in use. Bed locked and in lowest position, bedside table and nurse call light within reach. Instructed patient to call out for assistance. Patient remains free from falls at this time, will continue to monitor.

## 2018-11-13 NOTE — PROGRESS NOTES
Feels scared at times and feels like someone is in the [place], patient hearing med room closing and nurses in [place], comfort measures done frequently ,pt up in chair 2 hours per barbara chan, uses is up to 1000, medicated with tylenol for back pain , feel better.

## 2018-11-13 NOTE — PROGRESS NOTES
for safe hand placement with use of RW)  Functional Mobility  Functional - Mobility Device: Rolling Walker  Activity:  (BSC<>chair)  Assist Level: Minimal assistance  Functional Mobility Comments: assist required for RW management with manipulation of objects/turns, v/c to progress and sequence transfer  Toilet Transfers  Toilet - Technique: Stand step  Equipment Used: Standard bedside commode  Toilet Transfer: Minimal assistance  Toilet Transfers Comments: without use of AD, one-step cues to sequence, holding onto therapist for increased support. Bed mobility  Supine to Sit: Moderate assistance (HOB elevated, tactile cues for hand placement on the rail, min-mod assist required at trunk)  Scooting: Contact guard assistance (forward to EOB)  Transfers  Sit to stand: Moderate assistance (from EOB, progressing to min assist from [facility]. Verbal and tactile cues required for safe hand placement with use of RW. Progressing to min assist from [facility])  Stand to sit: Minimal assistance (verbal and tactile cues required for safe hand placement with use of RW)  Cognition  Memory: Decreased recall of recent events  Safety Judgement: Decreased awareness of need for safety  Insights: Decreased awareness of deficits  Initiation: Requires cues for all  Sequencing: Requires cues for all     Assessment   Performance deficits / Impairments: Decreased functional mobility ; Decreased ADL status; Decreased strength;Decreased safe awareness;Decreased balance;Decreased endurance  Assessment: Patient demonstrating improved ability to complete stand step transfers with the RW this date, requiring min assist x1. Continues to be limited by cognitive status, with one-step cues required for sequencing of tasks and re-orientation to environment. Unsafe to d/c home without 24hr physical assist and supervision d/t cognition and need for physical assistance. Would continue to benefit from OT services, cont POC.    Treatment Diagnosis: Decreased ADLs,

## 2018-11-14 VITALS
DIASTOLIC BLOOD PRESSURE: 51 MMHG | OXYGEN SATURATION: 96 % | TEMPERATURE: 97.5 F | HEIGHT: 65 IN | BODY MASS INDEX: 31.65 KG/M2 | SYSTOLIC BLOOD PRESSURE: 135 MMHG | WEIGHT: 190 LBS | HEART RATE: 69 BPM | RESPIRATION RATE: 18 BRPM

## 2018-11-14 LAB
ALBUMIN SERPL-MCNC: 2.4 G/DL (ref 3.1–4.9)
ALPHA-1-GLOBULIN: 0.4 G/DL (ref 0.2–0.4)
ALPHA-2-GLOBULIN: 1.2 G/DL (ref 0.4–1.1)
ANION GAP SERPL CALCULATED.3IONS-SCNC: 10 MMOL/L (ref 3–16)
BASOPHILS ABSOLUTE: 0 K/UL (ref 0–0.2)
BASOPHILS RELATIVE PERCENT: 0 %
BETA GLOBULIN: 1 G/DL (ref 0.9–1.6)
BUN BLDV-MCNC: 63 MG/DL (ref 7–20)
CALCIUM SERPL-MCNC: 8.3 MG/DL (ref 8.3–10.6)
CHLORIDE BLD-SCNC: 109 MMOL/L (ref 99–110)
CO2: 23 MMOL/L (ref 21–32)
CREAT SERPL-MCNC: 1.4 MG/DL (ref 0.6–1.2)
EOSINOPHILS ABSOLUTE: 0.1 K/UL (ref 0–0.6)
EOSINOPHILS RELATIVE PERCENT: 1 %
GAMMA GLOBULIN: 0.7 G/DL (ref 0.6–1.8)
GFR AFRICAN AMERICAN: 45
GFR NON-AFRICAN AMERICAN: 37
GLUCOSE BLD-MCNC: 182 MG/DL (ref 70–99)
GLUCOSE BLD-MCNC: 184 MG/DL (ref 70–99)
GLUCOSE BLD-MCNC: 188 MG/DL (ref 70–99)
GLUCOSE BLD-MCNC: 204 MG/DL (ref 70–99)
HCT VFR BLD CALC: 26.2 % (ref 36–48)
HEMOGLOBIN: 9 G/DL (ref 12–16)
LYMPHOCYTES ABSOLUTE: 2.2 K/UL (ref 1–5.1)
LYMPHOCYTES RELATIVE PERCENT: 16 %
MCH RBC QN AUTO: 32.2 PG (ref 26–34)
MCHC RBC AUTO-ENTMCNC: 34.2 G/DL (ref 31–36)
MCV RBC AUTO: 94 FL (ref 80–100)
MONOCYTES ABSOLUTE: 0.4 K/UL (ref 0–1.3)
MONOCYTES RELATIVE PERCENT: 3 %
NEUTROPHILS ABSOLUTE: 10.8 K/UL (ref 1.7–7.7)
NEUTROPHILS RELATIVE PERCENT: 80 %
PDW BLD-RTO: 14.2 % (ref 12.4–15.4)
PERFORMED ON: ABNORMAL
PLATELET # BLD: 354 K/UL (ref 135–450)
PMV BLD AUTO: 8.1 FL (ref 5–10.5)
POTASSIUM REFLEX MAGNESIUM: 4.8 MMOL/L (ref 3.5–5.1)
RBC # BLD: 2.79 M/UL (ref 4–5.2)
RBC # BLD: NORMAL 10*6/UL
SODIUM BLD-SCNC: 142 MMOL/L (ref 136–145)
SPE/IFE INTERPRETATION: NORMAL
TOTAL PROTEIN: 5.7 G/DL (ref 6.4–8.2)
WBC # BLD: 13.5 K/UL (ref 4–11)

## 2018-11-14 PROCEDURE — 99232 SBSQ HOSP IP/OBS MODERATE 35: CPT | Performed by: INTERNAL MEDICINE

## 2018-11-14 PROCEDURE — 6370000000 HC RX 637 (ALT 250 FOR IP): Performed by: STUDENT IN AN ORGANIZED HEALTH CARE EDUCATION/TRAINING PROGRAM

## 2018-11-14 PROCEDURE — 94761 N-INVAS EAR/PLS OXIMETRY MLT: CPT

## 2018-11-14 PROCEDURE — 2700000000 HC OXYGEN THERAPY PER DAY

## 2018-11-14 PROCEDURE — 97530 THERAPEUTIC ACTIVITIES: CPT

## 2018-11-14 PROCEDURE — 85025 COMPLETE CBC W/AUTO DIFF WBC: CPT

## 2018-11-14 PROCEDURE — 36415 COLL VENOUS BLD VENIPUNCTURE: CPT

## 2018-11-14 PROCEDURE — 80048 BASIC METABOLIC PNL TOTAL CA: CPT

## 2018-11-14 PROCEDURE — 6370000000 HC RX 637 (ALT 250 FOR IP): Performed by: INTERNAL MEDICINE

## 2018-11-14 PROCEDURE — 6360000002 HC RX W HCPCS: Performed by: STUDENT IN AN ORGANIZED HEALTH CARE EDUCATION/TRAINING PROGRAM

## 2018-11-14 PROCEDURE — 2580000003 HC RX 258: Performed by: STUDENT IN AN ORGANIZED HEALTH CARE EDUCATION/TRAINING PROGRAM

## 2018-11-14 PROCEDURE — 97110 THERAPEUTIC EXERCISES: CPT

## 2018-11-14 RX ORDER — ALPRAZOLAM 0.25 MG/1
0.12 TABLET ORAL DAILY
Qty: 2 TABLET | Refills: 0 | Status: SHIPPED | OUTPATIENT
Start: 2018-11-14 | End: 2018-11-18

## 2018-11-14 RX ORDER — ALPRAZOLAM 0.25 MG/1
0.25 TABLET ORAL NIGHTLY
Qty: 14 TABLET | Refills: 0 | Status: SHIPPED | OUTPATIENT
Start: 2018-11-14 | End: 2018-11-28

## 2018-11-14 RX ORDER — AMLODIPINE BESYLATE 5 MG/1
5 TABLET ORAL NIGHTLY
Qty: 30 TABLET | Refills: 0 | Status: SHIPPED | OUTPATIENT
Start: 2018-11-14 | End: 2019-06-06

## 2018-11-14 RX ORDER — CARVEDILOL 25 MG/1
25 TABLET ORAL 2 TIMES DAILY WITH MEALS
Qty: 60 TABLET | Refills: 0 | Status: SHIPPED | OUTPATIENT
Start: 2018-11-14

## 2018-11-14 RX ORDER — HYDRALAZINE HYDROCHLORIDE 100 MG/1
100 TABLET, FILM COATED ORAL EVERY 8 HOURS SCHEDULED
Qty: 90 TABLET | Refills: 0 | Status: SHIPPED | OUTPATIENT
Start: 2018-11-14 | End: 2019-06-06

## 2018-11-14 RX ADMIN — LEVETIRACETAM 500 MG: 100 INJECTION, SOLUTION INTRAVENOUS at 09:42

## 2018-11-14 RX ADMIN — PANTOPRAZOLE SODIUM 40 MG: 40 TABLET, DELAYED RELEASE ORAL at 06:15

## 2018-11-14 RX ADMIN — CARVEDILOL 25 MG: 25 TABLET, FILM COATED ORAL at 09:42

## 2018-11-14 RX ADMIN — HYDRALAZINE HYDROCHLORIDE 100 MG: 50 TABLET, FILM COATED ORAL at 13:42

## 2018-11-14 RX ADMIN — INSULIN LISPRO 3 UNITS: 100 INJECTION, SOLUTION INTRAVENOUS; SUBCUTANEOUS at 12:38

## 2018-11-14 RX ADMIN — ALPRAZOLAM 0.12 MG: 0.25 TABLET ORAL at 09:42

## 2018-11-14 RX ADMIN — INSULIN LISPRO 3 UNITS: 100 INJECTION, SOLUTION INTRAVENOUS; SUBCUTANEOUS at 09:46

## 2018-11-14 RX ADMIN — GUAIFENESIN 600 MG: 600 TABLET, EXTENDED RELEASE ORAL at 09:42

## 2018-11-14 RX ADMIN — PANTOPRAZOLE SODIUM 40 MG: 40 TABLET, DELAYED RELEASE ORAL at 16:37

## 2018-11-14 RX ADMIN — HYDRALAZINE HYDROCHLORIDE 100 MG: 50 TABLET, FILM COATED ORAL at 06:15

## 2018-11-14 RX ADMIN — CARVEDILOL 25 MG: 25 TABLET, FILM COATED ORAL at 16:37

## 2018-11-14 RX ADMIN — ACETAMINOPHEN 650 MG: 325 TABLET, FILM COATED ORAL at 13:42

## 2018-11-14 RX ADMIN — ASPIRIN 81 MG: 81 TABLET ORAL at 09:42

## 2018-11-14 RX ADMIN — ESCITALOPRAM OXALATE 10 MG: 10 TABLET ORAL at 09:42

## 2018-11-14 ASSESSMENT — PAIN DESCRIPTION - ORIENTATION: ORIENTATION: ANTERIOR

## 2018-11-14 ASSESSMENT — PAIN SCALES - GENERAL
PAINLEVEL_OUTOF10: 0
PAINLEVEL_OUTOF10: 3
PAINLEVEL_OUTOF10: 0

## 2018-11-14 ASSESSMENT — PAIN DESCRIPTION - PAIN TYPE: TYPE: ACUTE PAIN

## 2018-11-14 ASSESSMENT — PAIN SCALES - PAIN ASSESSMENT IN ADVANCED DEMENTIA (PAINAD)
FACIALEXPRESSION: 0
CONSOLABILITY: 0
CONSOLABILITY: 0
TOTALSCORE: 0
TOTALSCORE: 0
BREATHING: 0
NEGVOCALIZATION: 0
NEGVOCALIZATION: 0
FACIALEXPRESSION: 0
BODYLANGUAGE: 0
BREATHING: 0
BODYLANGUAGE: 0

## 2018-11-14 ASSESSMENT — PAIN DESCRIPTION - FREQUENCY: FREQUENCY: INTERMITTENT

## 2018-11-14 ASSESSMENT — PAIN DESCRIPTION - LOCATION: LOCATION: HEAD

## 2018-11-14 ASSESSMENT — PAIN DESCRIPTION - ONSET: ONSET: SUDDEN

## 2018-11-14 ASSESSMENT — PAIN DESCRIPTION - PROGRESSION: CLINICAL_PROGRESSION: GRADUALLY WORSENING

## 2018-11-14 ASSESSMENT — PAIN DESCRIPTION - DESCRIPTORS: DESCRIPTORS: HEADACHE

## 2018-11-14 NOTE — DISCHARGE INSTR - COC
Continuity of Care Form    Patient Name: Patricia Melgoza   :  1950  MRN:  [id number]    Admit date:  11/3/2018  Discharge date:  2018    Code Status Order: Full Code   Advance Directives:   Advance Care Flowsheet Documentation     Date/Time Healthcare Directive Type of Healthcare Directive Copy in [address] Agent's Name Healthcare Agent's Phone Number    [phone]  Yes, patient has an advance directive for healthcare treatment  Living will;Durable power of  for health care  Yes, copy in chart  Healthcare power of   Dayne Pitts   [phone]          Admitting Physician:  Gina King MD  PCP: Vidhi Argueta MD    Discharging Nurse: [facility] Unit/Room#: 8142/1392-56  Discharging Unit Phone Number: [phone]    Emergency Contact:   Extended Emergency Contact Information  Primary Emergency Contact: [address] Phone: [phone]  Work Phone: [phone]  Mobile Phone: [phone]  Relation: Child    Past Surgical History:  Past Surgical History:   Procedure Laterality Date   Girma Casey, per pt   [address]  10/03    has hardware    AORTO-FEMORAL BYPASS GRAFT      ARTERIAL BYPASS SURGRY Left 2011    Dr. Petty Lorenzo - femoral-PT bypass   [facility]  5/3/2012    Dr. Petty Lorenzo - Joaquin Almodovar Left 2011    Dr. Petty Lorenzo - redo L profunda fem - PT composite w/basilic & cephalic vein grafts   [address] [facility] [place], LAPAROSCOPIC  2014    Dr. Nicol Waldrop  9/10/2015    Dr. Gris Lopez - 2.5 cm sessile polyp distal sigmoid - invasive adenocarcinoma    DILATATION, ESOPHAGUS      IR FEMORAL POPLITEAL BYPASS GRAFT      OTHER SURGICAL HISTORY Left 2011    Dr. Petty Lorenzo - drainage L thigh hematoma    OTHER SURGICAL HISTORY  2016

## 2018-11-14 NOTE — PROGRESS NOTES
5' 5\" (165.1 cm)   · Current Body Wt: 190 lb (86.2 kg) (stated)  · Ideal Body Wt: 125 lb (56.7 kg), % Ideal Body    · BMI Classification: BMI 30.0 - 34.9 Obese Class I    Nutrition Interventions:   Continue current diet (restart Ensure at nursing home)  Continued Inpatient Monitoring    Nutrition Evaluation:   · Evaluation: Progressing toward goals   · Goals: pt will tolerate and consume 50% or more of all meals and supplements offered     · Monitoring: Supplement Intake, Meal Intake, Pertinent Labs      Electronically signed by Kori Pena RD, LD on 11/14/18 at 3:19 PM    Contact Number: [phone]

## 2018-11-14 NOTE — DISCHARGE SUMMARY
Hospital Medicine Discharge Summary    Patient ID: Tricia Anderson   Gender: female  : 1950   Age: 77 y.o. MRN: [id number]  Code Status: Full Code    Patient's PCP: Madelaine Lai MD    Admit Date: 11/3/2018     Discharge Date:   18    Admitting Physician: Isabela Soto MD     Discharge Physician: Debo Renee MD     Discharge Diagnoses: Active Hospital Problems    Diagnosis Date Noted    Elevated erythrocyte sedimentation rate [R70.0] 2018    Elevated C-reactive protein (CRP) [R79.82] 2018    Seizure ([address] [R56.9] 2018    Leukocytosis [D72.829]        The patient was seen and examined on day of discharge and this discharge summary is in conjunction with any daily progress note from day of discharge. Hospital Course:   Tricia Anderson is a 77 y.o. female with PMH of stroke (1 mth ago; now with waxing and waning mental status), CAD, PAD, DM2, HLD, and CKD 3 who p/w witnessed seizure at home and in the emergency department. Seizure  Seizure was thought secondary to vascular etiology due to recent stroke. CT head showed subacute infarct in the right occipital lobe. CTA of the head and neck was concerning for 50% stenosis of L common carotid artery, hemodynamic 75% stenosis of left internal carotid artery with marked irregularity and severe disease of right P2 segment. Neurology was consulted and MRI was unable to be obtained due to patient's back pain. LP was also deferred initially and then subsequently due to anticoagulation. She was maintained on keppra 500mg BID, ASA and Plavix. EEG had no epileptiform discharges, but FIRDA activity and mild generalized slowing, which are non-specific findings. Infectious etiology was not thought to be the culprit and blood cultures and urinalysis were negative. ESR/CRP were elevated, however ANCA and DANIELLE were negative, and seizure was not thought to be due to vasculitis.  She did not have another episode of seizure stenosis, 50% diameter. CT in this region is limited due to pulsation artifact   2. Proximal right internal carotid artery carotid bulb ulcer   3. Hemodynamic arterial stenosis of the left internal carotid artery is 75% diameter stenosis   4. Right posterior communicating artery is decent size with marked irregularity and severe disease of the right P2 segment. Consideration is given to origin infarct from the right carotid through the posterior communicating artery to the posterior    cerebral P2 segment   5. No abnormal intracranial enhancement or early draining veins      Incidentally, moderate bilateral pleural effusions are present            CT HEAD WO CONTRAST   Final Result      No acute or interval change. XR FOOT RIGHT (2 VIEWS)   Final Result      1. No acute osseous abnormality. 2. Mild first MTP arthrosis and mild hindfoot arthrosis. 3. Plantar calcaneal enthesophyte. XR CHEST PORTABLE   Final Result      1. Mild new consolidation of the right mid and lower lung zones. 2. Sclerotic lesion of the right humeral head progressed dating back to chest x-ray from 9/10/2010, favored to be benign. XR CHEST PORTABLE   Final Result      Right lower lobe atelectasis and patchy pneumonia. CT ABDOMEN PELVIS W IV CONTRAST Additional Contrast? None   Final Result      No change in 3 cm x 4.2 cm right adrenal mass. Extensive arterial wall calcification. No change in small aneurysm left common iliac artery. No abnormal bowel dilatation or wall thickening. No acute abnormality or abnormal fluid collection. CT Head WO Contrast   Final Result      1. Subacute infarct in the right occipital lobe. 2.  No acute intracranial hemorrhage or mass effect.       MRI BRAIN W WO CONTRAST    (Results Pending)   CTA PULMONARY W CONTRAST    (Results Pending)          Consults:     IP CONSULT TO HOSPITALIST  IP CONSULT TO NEUROLOGY  IP CONSULT TO SOCIAL WORK  IP CONSULT TO NEPHROLOGY  IP CONSULT TO PULMONOLOGY  IP CONSULT TO INFECTIOUS DISEASES    Disposition:  SNF     Condition at Discharge: Stable    Discharge Instructions/Follow-up:  Please follow up with neurology within 2 weeks and your PCP within 1 week. You should continue to take coreg, lisinopril, hydralazine and norvasc on discharge. You should also take xanax . 125 during the day for 4 days and then stop that medication. You will continue taking xanax . 25 at night. Code Status:  Full Code     Activity: activity as tolerated    Diet: cardiac diet      Discharge Medications:     Current Discharge Medication List           Details   escitalopram (LEXAPRO) 10 MG tablet Take 10 mg by mouth daily      !! insulin lispro (HUMALOG) 100 UNIT/ML injection vial Inject 0-6 Units into the skin 3 times daily (before meals) Sliding scale   150-200 - 1 unit  201-250 - 2 units  251-300 - 3 units  301-350 - 4 units  351-400 - 5 units   >400   - 6 units and call MD      !! insulin lispro (HUMALOG) 100 UNIT/ML injection vial Inject 3 Units into the skin 3 times daily (before meals) 3 units Sq before meals plus sliding scale. insulin glargine (LANTUS) 100 UNIT/ML injection vial Inject 20 Units into the skin nightly      oxyCODONE-acetaminophen (PERCOCET) 7.5-325 MG per tablet Take 1 tablet by mouth every 8 hours as needed for Pain. .      ondansetron (ZOFRAN) 4 MG tablet Take 4 mg by mouth every 6 hours as needed for Nausea or Vomiting      loperamide (IMODIUM) 2 MG capsule Take 2 mg by mouth 4 times daily as needed for Diarrhea      clopidogrel (PLAVIX) 75 MG tablet Take 1 tablet by mouth nightly  Qty: 30 tablet, Refills: 3      atorvastatin (LIPITOR) 40 MG tablet Take 40 mg by mouth nightly      magnesium oxide (MAG-OX) 400 MG tablet Take 400 mg by mouth daily      pantoprazole (PROTONIX) 40 MG tablet Take 1 tablet by mouth daily  Qty: 30 tablet, Refills: 0      ALPRAZolam (XANAX) 0.25 MG tablet Take 1 tablet by mouth 3 times daily  Qty:

## 2018-11-14 NOTE — CARE COORDINATION
· Discharge order noted and arranged for transport to [facility] at [address] via [facility] for 1730. Called pt's daughter to let her know of the discharge time. RN to call report to Phone:Report- [phone] have faxed all order to [phone]. HENS submitted.      Electronically signed by Jannie Moore RN on 11/14/2018 at 5:32 PM

## 2018-11-14 NOTE — PROGRESS NOTES
soft and calcific plaque in left common carotid artery, borderline significant stenosis, 50% diameter. CT in this region is limited due to pulsation artifact   2. Proximal right internal carotid artery carotid bulb ulcer   3. Hemodynamic arterial stenosis of the left internal carotid artery is 75% diameter stenosis   4. Right posterior communicating artery is decent size with marked irregularity and severe disease of the right P2 segment. Consideration is given to origin infarct from the right carotid through the posterior communicating artery to the posterior    cerebral P2 segment   5. No abnormal intracranial enhancement or early draining veins      Incidentally, moderate bilateral pleural effusions are present            CT HEAD WO CONTRAST   Final Result   1. Proximal soft and calcific plaque in left common carotid artery, borderline significant stenosis, 50% diameter. CT in this region is limited due to pulsation artifact   2. Proximal right internal carotid artery carotid bulb ulcer   3. Hemodynamic arterial stenosis of the left internal carotid artery is 75% diameter stenosis   4. Right posterior communicating artery is decent size with marked irregularity and severe disease of the right P2 segment. Consideration is given to origin infarct from the right carotid through the posterior communicating artery to the posterior    cerebral P2 segment   5. No abnormal intracranial enhancement or early draining veins      Incidentally, moderate bilateral pleural effusions are present            CT HEAD WO CONTRAST   Final Result      No acute or interval change. XR FOOT RIGHT (2 VIEWS)   Final Result      1. No acute osseous abnormality. 2. Mild first MTP arthrosis and mild hindfoot arthrosis. 3. Plantar calcaneal enthesophyte. XR CHEST PORTABLE   Final Result      1. Mild new consolidation of the right mid and lower lung zones.    2. Sclerotic lesion of the right humeral head progressed dating back to

## 2018-11-14 NOTE — CARE COORDINATION
CM notified by MD that patient medically cleared for d/c. MD stated that daughter Socorro Mathis had questions for CM. CM contacted Socorro Mathis [phone]. Left a voicemail with update on plan (d/c today to [facility]) and asked her to call me back with any questions.         Thank you,     Sonia Macdonald, RN Case Manager  [facility], [place]   P: [phone]

## 2018-11-14 NOTE — PROGRESS NOTES
Occupational Therapy  Facility/Department: [facility] 5T ORTHO/NEURO  Daily Treatment Note  NAME: Teresa Mckoy  : 1950  MRN: [id number]    Date of Service: 2018    Discharge Recommendations: Teresa Mckoy scored a 14/24 on the AM-PAC ADL Inpatient form. Current research shows that an AM-PAC score of 17 or less is typically not associated with a discharge to the patient's home setting. Based on the patients AM-PAC score and their current ADL deficits, it is recommended that the patient have 3-5 sessions per week of Occupational Therapy at d/c to increase the patients independence. OT Equipment Recommendations  Other: defer    Patient Diagnosis(es): The primary encounter diagnosis was Seizure ([address] Diagnoses of Non-intractable vomiting with nausea, unspecified vomiting type, Nonintractable headache, unspecified chronicity pattern, unspecified headache type, Pneumonia due to organism, and Anxiety were also pertinent to this visit. has a past medical history of Adenomatous colon polyp; Adrenal nodule ([address] Allergic rhinitis; Anxiety; Back pain; Bacterial infection due to Staphylococcus aureus; Blood transfusion; CAD (coronary artery disease); Cancer of sigmoid colon ([address] Depressed; Diabetes mellitus type II; Dry skin; Fibromyalgia; GERD (gastroesophageal reflux disease); History of blood transfusion; HTN (hypertension); Hyperlipemia; Infection of prosthetic vascular graft ([address] Insomnia; Lumbar disc disease; Lumbar spinal stenosis; Memory loss; Numbness; Osteopenia; Peripheral vascular disease ([address] PONV (postoperative nausea and vomiting); Renal artery stenosis ([address] and S/P vascular surgery. has a past surgical history that includes Ankle fracture surgery (10/03); Aorto-femoral Bypass Graft (); Toe amputation (); IR Femoral Popliteal Bypass Graft (); Tubal ligation; Bypass Graft (Left, 2011); other surgical history (Left, 2011);  Arterial bypass surgry (Left, 11/2011); vascular surgery (Left, 12/1/2011); Balloon angioplasty, artery (5/3/2012); Cholecystectomy, laparoscopic (1/21/2014); Colonoscopy (9/10/2015); AAA repair, open (1991); sigmoidectomy (12/3/2015); other surgical history (2/22/2016); Dilatation, esophagus; Cardiac surgery; other surgical history (Left, 07/08/2016); other surgical history (Left, 07/14/2016); and Tunneled venous catheter placement (Left, 07/12/2016). Restrictions  Position Activity Restriction  Other position/activity restrictions: maintain HOB at lowest possible elevation consistent with medical plan of care  Subjective   General  Chart Reviewed: Yes  Additional Pertinent Hx: 77 y.o. F admitted 11/3 with nausea and vomiting; head CT: (+)  Subacute infarct in the right occipital lobe, (-) hemorrhage or mass. Family / Caregiver Present: No  Diagnosis: Seizure  Subjective  Subjective: Pt in bed upon entry; agreeable to OT. Pain Assessment  Patient Currently in Pain: Yes (pain w/ movement, not rated)    Orientation  Orientation  Overall Orientation Status: Impaired  Orientation Level: Oriented to person (oriented to 'hospital,' very distracted with hallway traffic)     Objective     Treatment included functional transfer training, ADLs, and patient education. ADL  Grooming:  (assist to comb hair sitting eob)  UE Bathing: Dependent/Total (to wipe back seated eob)        Balance  Sitting Balance: Minimal assistance (seated at EOB x3 mins- pt leaning on bedrail, leaning forward; c/o wanting to lay back down)  Standing Balance: Unable to assess(comment)  Bed mobility  Supine to Sit: Dependent/Total (max A x2- pt partially completes 1st trial, gets to long sitting then lies self back down.  Max A x2 for successful 2nd trial w/ HOB up and use of rail)  Sit to Supine: Dependent/Total (mod A x2- assist at trunk + LEs; HOB elevated)     Cognition  Overall Cognitive Status: Exceptions  Memory: Decreased recall of recent events  Safety Judgement: Decreased awareness of need for safety  Insights: Decreased awareness of deficits  Initiation: Requires cues for all  Sequencing: Requires cues for all  Cognition Comment: Pt consistently asking about events happening in hallway (medical team walking by)- \"Is that someone coming in here? Whats that thing in the window? \"     Type of ROM/Therapeutic Exercise  Type of ROM/Therapeutic Exercise: AROM;AAROM  Exercises  Shoulder Flexion: x10 BUE- one UE at a time  Shoulder ABduction: x10 BUE- one UE at a time        Assessment   Performance deficits / Impairments: Decreased functional mobility ; Decreased ADL status; Decreased strength;Decreased safe awareness;Decreased balance;Decreased endurance  Assessment: Pt with decreased activity tolerance/participation this session. C/o continuous back pain and needs encouragement to participate. Requires frequent cues for re-orientation to situation. Plans are for d/c to SNF today. Will continue to follow if d/c is delayed. Treatment Diagnosis: Decreased ADLs, transfers, and mobility  Patient Education: Role of OT, activity promotion- pt needs reinforcement  REQUIRES OT FOLLOW UP: Yes  Activity Tolerance  Activity Tolerance: Treatment limited secondary to decreased cognition;Patient limited by pain  Safety Devices  Safety Devices in place: Yes  Type of devices: Left in bed;Call light within reach;Nurse notified; Bed alarm in place          Plan   Plan  Times per week: 2-5x  Times per day: Daily  If patient discharges prior to next treatment, this note will serve as discharge summary. Will continue per POC if patient does not discharge.     AM-PAC Score        AM-PAC Inpatient Daily Activity Raw Score: 14  AM-PAC Inpatient ADL T-Scale Score : 33.39  ADL Inpatient CMS 0-100% Score: 59.67  ADL Inpatient CMS G-Code Modifier : CK    Goals  Short term goals  Time Frame for Short term goals: By d/c   Short term goal 1: Standing balance for further ADLs/mobility x5 mins and CGA - not met

## 2019-02-18 ENCOUNTER — OFFICE VISIT (OUTPATIENT)
Dept: ORTHOPEDIC SURGERY | Age: 69
End: 2019-02-18
Payer: COMMERCIAL

## 2019-02-18 VITALS
BODY MASS INDEX: 31.66 KG/M2 | WEIGHT: 190.04 LBS | HEIGHT: 65 IN | HEART RATE: 69 BPM | SYSTOLIC BLOOD PRESSURE: 131 MMHG | DIASTOLIC BLOOD PRESSURE: 77 MMHG

## 2019-02-18 DIAGNOSIS — S22.060A CLOSED WEDGE COMPRESSION FRACTURE OF EIGHTH THORACIC VERTEBRA, INITIAL ENCOUNTER: ICD-10-CM

## 2019-02-18 DIAGNOSIS — M51.36 DDD (DEGENERATIVE DISC DISEASE), LUMBAR: ICD-10-CM

## 2019-02-18 DIAGNOSIS — S32.020A CLOSED COMPRESSION FRACTURE OF SECOND LUMBAR VERTEBRA, INITIAL ENCOUNTER: ICD-10-CM

## 2019-02-18 DIAGNOSIS — S22.080A T12 COMPRESSION FRACTURE (HCC): ICD-10-CM

## 2019-02-18 DIAGNOSIS — M54.6 ACUTE MIDLINE THORACIC BACK PAIN: Primary | ICD-10-CM

## 2019-02-18 DIAGNOSIS — M54.50 LUMBAR PAIN: ICD-10-CM

## 2019-02-18 PROCEDURE — 99203 OFFICE O/P NEW LOW 30 MIN: CPT | Performed by: PHYSICIAN ASSISTANT

## 2019-02-18 PROCEDURE — L0462 TLSO 3MOD SACRO-SCAP PRE: HCPCS | Performed by: PHYSICIAN ASSISTANT

## 2019-02-19 ENCOUNTER — TELEPHONE (OUTPATIENT)
Dept: ORTHOPEDIC SURGERY | Age: 69
End: 2019-02-19

## 2019-06-06 ENCOUNTER — OFFICE VISIT (OUTPATIENT)
Dept: VASCULAR SURGERY | Age: 69
End: 2019-06-06
Payer: MEDICAID

## 2019-06-06 VITALS
SYSTOLIC BLOOD PRESSURE: 128 MMHG | HEIGHT: 65 IN | BODY MASS INDEX: 27.32 KG/M2 | WEIGHT: 164 LBS | DIASTOLIC BLOOD PRESSURE: 80 MMHG

## 2019-06-06 DIAGNOSIS — Z48.812 AFTERCARE FOLLOWING SURGERY OF THE CIRCULATORY SYSTEM: Primary | ICD-10-CM

## 2019-06-06 DIAGNOSIS — R60.0 LEG EDEMA, LEFT: ICD-10-CM

## 2019-06-06 PROCEDURE — 99214 OFFICE O/P EST MOD 30 MIN: CPT | Performed by: SURGERY

## 2019-06-06 RX ORDER — ARIPIPRAZOLE 2 MG/1
2 TABLET ORAL DAILY
COMMUNITY

## 2019-06-06 RX ORDER — ALPRAZOLAM 0.25 MG/1
0.25 TABLET ORAL NIGHTLY PRN
COMMUNITY

## 2019-06-06 RX ORDER — POTASSIUM CHLORIDE 1.5 G/1.77G
20 POWDER, FOR SOLUTION ORAL 2 TIMES DAILY
COMMUNITY

## 2019-06-06 RX ORDER — FOLIC ACID 1 MG/1
1 TABLET ORAL DAILY
COMMUNITY

## 2019-06-06 RX ORDER — FUROSEMIDE 40 MG/1
40 TABLET ORAL 2 TIMES DAILY
COMMUNITY

## 2019-06-06 RX ORDER — DIVALPROEX SODIUM 125 MG/1
125 TABLET, DELAYED RELEASE ORAL 3 TIMES DAILY
COMMUNITY

## 2019-06-06 NOTE — PROGRESS NOTES
composite w/basilic & cephalic vein grafts    CARDIAC SURGERY      CHOLECYSTECTOMY, LAPAROSCOPIC  1/21/2014    Dr. Merrill Media  9/10/2015    Dr. Nitza Castro - 2.5 cm sessile polyp distal sigmoid - invasive adenocarcinoma    DILATATION, ESOPHAGUS      IR FEMORAL POPLITEAL BYPASS GRAFT  11/06    OTHER SURGICAL HISTORY Left 6/13/2011    Dr. Deandre Kerr - drainage L thigh hematoma    OTHER SURGICAL HISTORY  2/22/2016    Dr. Karlene Reilly - ileostomy closure w/BIO A mesh abdominal wall repair    OTHER SURGICAL HISTORY Left 07/08/2016    Dr. Deandre Kerr - excision infected femoral to BK popliteal prosthetic graft, patch angioplasty L CFA w/endarterectomied popliteal artery, sartorius muscle flap closure of L groin wound    OTHER SURGICAL HISTORY Left 07/14/2016    Dr. Deandre Kerr - delayed primary closure of 2 LLE wounds, placement of wound vac    SIGMOIDECTOMY  12/3/2015    Dr. Karlene Reilly - laparascopic, w/mobilization of splenic flexure, extensive lysis of adhesions, ileostomy creation & rigid sigmoidoscopy    TOE AMPUTATION  11/06    TUBAL LIGATION      TUNNELED VENOUS CATHETER PLACEMENT Left 07/12/2016    [facility]    Dr. Deandre Kerr - vein patch angioplasty of proximal anastamosis & interposition cephalic vein graft revision of distal anastamosis of L fem-PT bypass graft,      Allergies   Allergen Reactions    Latex Dermatitis     Raw area    Adhesive Tape Dermatitis     Raw skin, tears    Cephalexin Shortness Of Breath, Swelling and Other (See Comments)     (KEFLEX)  LIPS AND TONGUE SWELLING    Chocolate Swelling    Peanut-Containing Drug Products Swelling    Ativan [Lorazepam] Other (See Comments)     AMS    Codeine Swelling    Dilaudid [Hydromorphone Hcl] Other (See Comments)     See things    Penicillins Swelling     Current Outpatient Medications   Medication Sig Dispense Refill    ARIPiprazole (ABILIFY) 2 MG tablet Take 2 mg by mouth daily      oriented to person, place, and time. She appears well-developed and well-nourished. HENT:   Head: Normocephalic and atraumatic. Eyes: Conjunctivae and EOM are normal.   Neck: Neck supple. No JVD present. No tracheal deviation present. No thyromegaly present. Cardiovascular: Normal rate, regular rhythm and normal heart sounds. Pulmonary/Chest: Effort normal and breath sounds normal.   Abdominal: Soft. Bowel sounds are normal. She exhibits no mass. Musculoskeletal: She exhibits edema (asymmetric L leg edema). Lymphadenopathy:     She has no cervical adenopathy. Neurological: She is alert and oriented to person, place, and time. Skin: Skin is warm and dry. Psychiatric: She has a normal mood and affect. Her behavior is normal. Judgment and thought content normal.   Nursing note and vitals reviewed. 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 Patent clinically  3) S/P excision L leg bypass - no clinical signs of recurrent infection  4) L leg edema - likely multifactorial (dependency, inactivity, postop changes with lymphedema)  5) S/P CVA      Plan: Wrap L leg from ball of foot to knee at all times. Elevate. F/U in the next week for venous duplex R/O DVT and GSS. Will call with results and plans.

## 2019-06-12 ENCOUNTER — PROCEDURE VISIT (OUTPATIENT)
Dept: VASCULAR SURGERY | Age: 69
End: 2019-06-12
Payer: MEDICAID

## 2019-06-12 DIAGNOSIS — Z48.812 ENCOUNTER FOR POSTOPERATIVE SURVEILLANCE OF VASCULAR BYPASS SURGERY: ICD-10-CM

## 2019-06-12 DIAGNOSIS — R60.9 SWELLING: Primary | ICD-10-CM

## 2019-06-12 PROCEDURE — 93926 LOWER EXTREMITY STUDY: CPT | Performed by: SURGERY

## 2019-06-12 PROCEDURE — 93971 EXTREMITY STUDY: CPT | Performed by: SURGERY
